# Patient Record
Sex: MALE | Race: WHITE | Employment: FULL TIME | URBAN - NONMETROPOLITAN AREA
[De-identification: names, ages, dates, MRNs, and addresses within clinical notes are randomized per-mention and may not be internally consistent; named-entity substitution may affect disease eponyms.]

---

## 2021-05-28 ENCOUNTER — HOSPITAL ENCOUNTER (OUTPATIENT)
Age: 36
Discharge: HOME | End: 2021-05-28
Payer: COMMERCIAL

## 2021-05-28 PROCEDURE — U0005 INFEC AGEN DETEC AMPLI PROBE: HCPCS

## 2021-05-28 PROCEDURE — U0003 INFECTIOUS AGENT DETECTION BY NUCLEIC ACID (DNA OR RNA); SEVERE ACUTE RESPIRATORY SYNDROME CORONAVIRUS 2 (SARS-COV-2) (CORONAVIRUS DISEASE [COVID-19]), AMPLIFIED PROBE TECHNIQUE, MAKING USE OF HIGH THROUGHPUT TECHNOLOGIES AS DESCRIBED BY CMS-2020-01-R: HCPCS

## 2021-05-29 LAB
SARS-COV-2: NOT DETECTED
SOURCE: NORMAL

## 2021-06-03 ENCOUNTER — ANESTHESIA EVENT (OUTPATIENT)
Dept: OPERATING ROOM | Age: 36
End: 2021-06-03
Payer: OTHER GOVERNMENT

## 2021-06-03 RX ORDER — IBUPROFEN 200 MG
200 TABLET ORAL EVERY 6 HOURS PRN
COMMUNITY

## 2021-06-03 RX ORDER — OXYCODONE HYDROCHLORIDE AND ACETAMINOPHEN 5; 325 MG/1; MG/1
1 TABLET ORAL EVERY 6 HOURS PRN
COMMUNITY

## 2021-06-03 NOTE — PROGRESS NOTES
Spoke with pt. Via telephone  Pt. will arrive at the ER entrance at 0600 on 6/4/21. Pt.informed that they may have one visitor come with them. The visitor must be free of covid symptoms. Both of them must wear a mask upon entering the hospital.  If they do not have a mask, one will be given to them at the . The masks must be worn the whole time they are in the building. The visitor must stay in the assigned room in [facility]. The visitor may not go to the vending machines, cafeteria, or walk around in the hospital. Pt. Will be NPO after MN tonight, including no gum, candy, or nicotine products. Pt. will take no medications the morning of the procedure. If the patient uses inhalers or cpap, they will bring them with them to the hospital.  Pt. will shower with soap and water and will not use any lotions, creams, ointments on their skin. Pt. will wear no jewelry or metal. Covid-19 test was completed on 5/28/21  and results are negative  Pt. Has been self-quarantining since their Covid-19 testing. Pt. Has had no cough, sore throat, fever, or any other unusual s/s that the physician should be made aware of before surgery. Pt. Had no further questions and/or concerns at this time. \[facility]\"" phone number was given should any further questions arise. [phone].

## 2021-06-03 NOTE — ANESTHESIA PRE PROCEDURE
Department of Anesthesiology  Preprocedure Note       Name:  Kelly    Age:  39 y.o.  :  1985                                          MRN:  [id number]         Date:  6/3/2021      Surgeon: Fili Blake):  William Akers DPM    Procedure: Procedure(s):  RIGHT SECONDARY ACHILLES TENDON LENGTHENING REPAIR, RIGHT RESECTION OF POSTERIOR SPUR  RIGHT PLANTAR FASCIA FASCIOTOMY ENDOSCOPIC    Medications prior to admission:   Prior to Admission medications    Not on File       Current medications:    No current facility-administered medications for this encounter. No current outpatient medications on file. Allergies:  Not on File    Problem List:  There is no problem list on file for this patient. Past Medical History:  No past medical history on file. Past Surgical History:  No past surgical history on file. Social History:    Social History     Tobacco Use    Smoking status: Not on file   Substance Use Topics    Alcohol use: Not on file                                Counseling given: Not Answered      Vital Signs (Current): There were no vitals filed for this visit. BP Readings from Last 3 Encounters:   No data found for BP       NPO Status:                                                                                 BMI:   Wt Readings from Last 3 Encounters:   No data found for Wt     There is no height or weight on file to calculate BMI.    CBC: No results found for: WBC, RBC, HGB, HCT, MCV, RDW, PLT    CMP: No results found for: NA, K, CL, CO2, BUN, CREATININE, GFRAA, AGRATIO, LABGLOM, GLUCOSE, PROT, CALCIUM, BILITOT, ALKPHOS, AST, ALT    POC Tests: No results for input(s): POCGLU, POCNA, POCK, POCCL, POCBUN, POCHEMO, POCHCT in the last 72 hours.     Coags: No results found for: PROTIME, INR, APTT    HCG (If Applicable): No results found for: PREGTESTUR, PREGSERUM, HCG, HCGQUANT     ABGs: No results found for: PHART, PO2ART, CDJ2BLY,

## 2021-06-04 ENCOUNTER — APPOINTMENT (OUTPATIENT)
Dept: GENERAL RADIOLOGY | Age: 36
End: 2021-06-04
Attending: PODIATRIST
Payer: OTHER GOVERNMENT

## 2021-06-04 ENCOUNTER — HOSPITAL ENCOUNTER (OUTPATIENT)
Age: 36
Setting detail: OUTPATIENT SURGERY
Discharge: HOME | End: 2021-06-04
Attending: PODIATRIST | Admitting: PODIATRIST
Payer: OTHER GOVERNMENT

## 2021-06-04 ENCOUNTER — ANESTHESIA (OUTPATIENT)
Dept: OPERATING ROOM | Age: 36
End: 2021-06-04
Payer: OTHER GOVERNMENT

## 2021-06-04 VITALS
RESPIRATION RATE: 2 BRPM | DIASTOLIC BLOOD PRESSURE: 72 MMHG | OXYGEN SATURATION: 90 % | SYSTOLIC BLOOD PRESSURE: 124 MMHG | TEMPERATURE: 98.6 F

## 2021-06-04 VITALS
TEMPERATURE: 98 F | WEIGHT: 265 LBS | SYSTOLIC BLOOD PRESSURE: 134 MMHG | BODY MASS INDEX: 35.89 KG/M2 | DIASTOLIC BLOOD PRESSURE: 91 MMHG | HEIGHT: 72 IN | RESPIRATION RATE: 16 BRPM | OXYGEN SATURATION: 94 % | HEART RATE: 79 BPM

## 2021-06-04 DIAGNOSIS — M66.361: Primary | ICD-10-CM

## 2021-06-04 DIAGNOSIS — R52 PAIN: ICD-10-CM

## 2021-06-04 PROCEDURE — 6360000002 HC RX W HCPCS: Performed by: NURSE ANESTHETIST, CERTIFIED REGISTERED

## 2021-06-04 PROCEDURE — 2580000003 HC RX 258: Performed by: PODIATRIST

## 2021-06-04 PROCEDURE — 3700000001 HC ADD 15 MINUTES (ANESTHESIA): Performed by: PODIATRIST

## 2021-06-04 PROCEDURE — 2709999900 HC NON-CHARGEABLE SUPPLY: Performed by: PODIATRIST

## 2021-06-04 PROCEDURE — 2500000003 HC RX 250 WO HCPCS: Performed by: PODIATRIST

## 2021-06-04 PROCEDURE — 7100000010 HC PHASE II RECOVERY - FIRST 15 MIN: Performed by: PODIATRIST

## 2021-06-04 PROCEDURE — 3600000003 HC SURGERY LEVEL 3 BASE: Performed by: PODIATRIST

## 2021-06-04 PROCEDURE — 2580000003 HC RX 258: Performed by: NURSE ANESTHETIST, CERTIFIED REGISTERED

## 2021-06-04 PROCEDURE — 3700000000 HC ANESTHESIA ATTENDED CARE: Performed by: PODIATRIST

## 2021-06-04 PROCEDURE — 2780000010 HC IMPLANT OTHER: Performed by: PODIATRIST

## 2021-06-04 PROCEDURE — 2500000003 HC RX 250 WO HCPCS: Performed by: NURSE ANESTHETIST, CERTIFIED REGISTERED

## 2021-06-04 PROCEDURE — 7100000000 HC PACU RECOVERY - FIRST 15 MIN: Performed by: PODIATRIST

## 2021-06-04 PROCEDURE — 2720000010 HC SURG SUPPLY STERILE: Performed by: PODIATRIST

## 2021-06-04 PROCEDURE — C1713 ANCHOR/SCREW BN/BN,TIS/BN: HCPCS | Performed by: PODIATRIST

## 2021-06-04 PROCEDURE — 6360000002 HC RX W HCPCS: Performed by: PODIATRIST

## 2021-06-04 PROCEDURE — 3600000013 HC SURGERY LEVEL 3 ADDTL 15MIN: Performed by: PODIATRIST

## 2021-06-04 PROCEDURE — 64445 NJX AA&/STRD SCIATIC NRV IMG: CPT | Performed by: NURSE ANESTHETIST, CERTIFIED REGISTERED

## 2021-06-04 PROCEDURE — 7100000001 HC PACU RECOVERY - ADDTL 15 MIN: Performed by: PODIATRIST

## 2021-06-04 PROCEDURE — 73650 X-RAY EXAM OF HEEL: CPT

## 2021-06-04 PROCEDURE — 7100000011 HC PHASE II RECOVERY - ADDTL 15 MIN: Performed by: PODIATRIST

## 2021-06-04 DEVICE — FOOTPRINT ULTRA PK SUTURE ANCHOR,                                    4.5 MM, SL
Type: IMPLANTABLE DEVICE | Site: FOOT | Status: FUNCTIONAL
Brand: FOOTPRINT

## 2021-06-04 RX ORDER — FENTANYL CITRATE 50 UG/ML
INJECTION, SOLUTION INTRAMUSCULAR; INTRAVENOUS PRN
Status: DISCONTINUED | OUTPATIENT
Start: 2021-06-04 | End: 2021-06-04 | Stop reason: SDUPTHER

## 2021-06-04 RX ORDER — ROPIVACAINE HYDROCHLORIDE 5 MG/ML
INJECTION, SOLUTION EPIDURAL; INFILTRATION; PERINEURAL PRN
Status: DISCONTINUED | OUTPATIENT
Start: 2021-06-04 | End: 2021-06-04 | Stop reason: SDUPTHER

## 2021-06-04 RX ORDER — ROPIVACAINE HYDROCHLORIDE 5 MG/ML
INJECTION, SOLUTION EPIDURAL; INFILTRATION; PERINEURAL PRN
Status: DISCONTINUED | OUTPATIENT
Start: 2021-06-04 | End: 2021-06-04

## 2021-06-04 RX ORDER — BUPIVACAINE HYDROCHLORIDE 5 MG/ML
INJECTION, SOLUTION EPIDURAL; INTRACAUDAL
Status: COMPLETED | OUTPATIENT
Start: 2021-06-04 | End: 2021-06-04

## 2021-06-04 RX ORDER — ROCURONIUM BROMIDE 10 MG/ML
INJECTION, SOLUTION INTRAVENOUS PRN
Status: DISCONTINUED | OUTPATIENT
Start: 2021-06-04 | End: 2021-06-04 | Stop reason: SDUPTHER

## 2021-06-04 RX ORDER — SODIUM CHLORIDE 0.9 % (FLUSH) 0.9 %
5-40 SYRINGE (ML) INJECTION ONCE
Status: COMPLETED | OUTPATIENT
Start: 2021-06-04 | End: 2021-06-04

## 2021-06-04 RX ORDER — HYDRALAZINE HYDROCHLORIDE 20 MG/ML
5 INJECTION INTRAMUSCULAR; INTRAVENOUS EVERY 10 MIN PRN
Status: DISCONTINUED | OUTPATIENT
Start: 2021-06-04 | End: 2021-06-04 | Stop reason: HOSPADM

## 2021-06-04 RX ORDER — FENTANYL CITRATE 50 UG/ML
25 INJECTION, SOLUTION INTRAMUSCULAR; INTRAVENOUS EVERY 5 MIN PRN
Status: DISCONTINUED | OUTPATIENT
Start: 2021-06-04 | End: 2021-06-04 | Stop reason: HOSPADM

## 2021-06-04 RX ORDER — SODIUM CHLORIDE 9 MG/ML
INJECTION INTRAVENOUS PRN
Status: DISCONTINUED | OUTPATIENT
Start: 2021-06-04 | End: 2021-06-04 | Stop reason: SDUPTHER

## 2021-06-04 RX ORDER — DIPHENHYDRAMINE HYDROCHLORIDE 50 MG/ML
12.5 INJECTION INTRAMUSCULAR; INTRAVENOUS
Status: DISCONTINUED | OUTPATIENT
Start: 2021-06-04 | End: 2021-06-04 | Stop reason: HOSPADM

## 2021-06-04 RX ORDER — MIDAZOLAM HYDROCHLORIDE 1 MG/ML
INJECTION INTRAMUSCULAR; INTRAVENOUS PRN
Status: DISCONTINUED | OUTPATIENT
Start: 2021-06-04 | End: 2021-06-04 | Stop reason: SDUPTHER

## 2021-06-04 RX ORDER — MEPERIDINE HYDROCHLORIDE 25 MG/ML
12.5 INJECTION INTRAMUSCULAR; INTRAVENOUS; SUBCUTANEOUS EVERY 5 MIN PRN
Status: DISCONTINUED | OUTPATIENT
Start: 2021-06-04 | End: 2021-06-04 | Stop reason: HOSPADM

## 2021-06-04 RX ORDER — SODIUM CHLORIDE, SODIUM LACTATE, POTASSIUM CHLORIDE, CALCIUM CHLORIDE 600; 310; 30; 20 MG/100ML; MG/100ML; MG/100ML; MG/100ML
INJECTION, SOLUTION INTRAVENOUS CONTINUOUS PRN
Status: DISCONTINUED | OUTPATIENT
Start: 2021-06-04 | End: 2021-06-04 | Stop reason: SDUPTHER

## 2021-06-04 RX ORDER — PROPOFOL 10 MG/ML
INJECTION, EMULSION INTRAVENOUS PRN
Status: DISCONTINUED | OUTPATIENT
Start: 2021-06-04 | End: 2021-06-04 | Stop reason: SDUPTHER

## 2021-06-04 RX ORDER — PROMETHAZINE HYDROCHLORIDE 25 MG/ML
6.25 INJECTION, SOLUTION INTRAMUSCULAR; INTRAVENOUS
Status: DISCONTINUED | OUTPATIENT
Start: 2021-06-04 | End: 2021-06-04 | Stop reason: HOSPADM

## 2021-06-04 RX ORDER — DEXAMETHASONE SODIUM PHOSPHATE 4 MG/ML
INJECTION, SOLUTION INTRA-ARTICULAR; INTRALESIONAL; INTRAMUSCULAR; INTRAVENOUS; SOFT TISSUE PRN
Status: DISCONTINUED | OUTPATIENT
Start: 2021-06-04 | End: 2021-06-04 | Stop reason: SDUPTHER

## 2021-06-04 RX ORDER — HYDROCODONE BITARTRATE AND ACETAMINOPHEN 5; 325 MG/1; MG/1
2 TABLET ORAL PRN
Status: DISCONTINUED | OUTPATIENT
Start: 2021-06-04 | End: 2021-06-04 | Stop reason: HOSPADM

## 2021-06-04 RX ORDER — KETOROLAC TROMETHAMINE 30 MG/ML
INJECTION, SOLUTION INTRAMUSCULAR; INTRAVENOUS PRN
Status: DISCONTINUED | OUTPATIENT
Start: 2021-06-04 | End: 2021-06-04 | Stop reason: SDUPTHER

## 2021-06-04 RX ORDER — PROCHLORPERAZINE EDISYLATE 5 MG/ML
5 INJECTION INTRAMUSCULAR; INTRAVENOUS
Status: DISCONTINUED | OUTPATIENT
Start: 2021-06-04 | End: 2021-06-04 | Stop reason: HOSPADM

## 2021-06-04 RX ORDER — SODIUM CHLORIDE, SODIUM LACTATE, POTASSIUM CHLORIDE, CALCIUM CHLORIDE 600; 310; 30; 20 MG/100ML; MG/100ML; MG/100ML; MG/100ML
INJECTION, SOLUTION INTRAVENOUS CONTINUOUS
Status: DISCONTINUED | OUTPATIENT
Start: 2021-06-04 | End: 2021-06-04 | Stop reason: HOSPADM

## 2021-06-04 RX ORDER — 0.9 % SODIUM CHLORIDE 0.9 %
500 INTRAVENOUS SOLUTION INTRAVENOUS
Status: DISCONTINUED | OUTPATIENT
Start: 2021-06-04 | End: 2021-06-04 | Stop reason: HOSPADM

## 2021-06-04 RX ORDER — HYDROCODONE BITARTRATE AND ACETAMINOPHEN 5; 325 MG/1; MG/1
1 TABLET ORAL PRN
Status: DISCONTINUED | OUTPATIENT
Start: 2021-06-04 | End: 2021-06-04 | Stop reason: HOSPADM

## 2021-06-04 RX ORDER — ONDANSETRON 2 MG/ML
INJECTION INTRAMUSCULAR; INTRAVENOUS PRN
Status: DISCONTINUED | OUTPATIENT
Start: 2021-06-04 | End: 2021-06-04 | Stop reason: SDUPTHER

## 2021-06-04 RX ORDER — ROPIVACAINE HYDROCHLORIDE 5 MG/ML
INJECTION, SOLUTION EPIDURAL; INFILTRATION; PERINEURAL
Status: COMPLETED | OUTPATIENT
Start: 2021-06-04 | End: 2021-06-04

## 2021-06-04 RX ADMIN — SODIUM CHLORIDE, POTASSIUM CHLORIDE, SODIUM LACTATE AND CALCIUM CHLORIDE: 600; 310; 30; 20 INJECTION, SOLUTION INTRAVENOUS at 06:39

## 2021-06-04 RX ADMIN — PROPOFOL 40 MG: 10 INJECTION, EMULSION INTRAVENOUS at 10:08

## 2021-06-04 RX ADMIN — FENTANYL CITRATE 50 MCG: 50 INJECTION, SOLUTION INTRAMUSCULAR; INTRAVENOUS at 08:08

## 2021-06-04 RX ADMIN — SODIUM CHLORIDE, POTASSIUM CHLORIDE, SODIUM LACTATE AND CALCIUM CHLORIDE: 600; 310; 30; 20 INJECTION, SOLUTION INTRAVENOUS at 10:35

## 2021-06-04 RX ADMIN — CEFAZOLIN 3000 MG: 1 INJECTION, POWDER, FOR SOLUTION INTRAMUSCULAR; INTRAVENOUS; PARENTERAL at 08:16

## 2021-06-04 RX ADMIN — SODIUM CHLORIDE 20 ML: 9 INJECTION, SOLUTION INTRAMUSCULAR; INTRAVENOUS; SUBCUTANEOUS at 09:02

## 2021-06-04 RX ADMIN — KETOROLAC TROMETHAMINE 15 MG: 30 INJECTION, SOLUTION INTRAMUSCULAR; INTRAVENOUS at 10:18

## 2021-06-04 RX ADMIN — SODIUM CHLORIDE, PRESERVATIVE FREE 10 ML: 5 INJECTION INTRAVENOUS at 06:39

## 2021-06-04 RX ADMIN — MIDAZOLAM 2 MG: 1 INJECTION INTRAMUSCULAR; INTRAVENOUS at 07:35

## 2021-06-04 RX ADMIN — ROCURONIUM BROMIDE 20 MG: 10 SOLUTION INTRAVENOUS at 09:49

## 2021-06-04 RX ADMIN — PROPOFOL 200 MG: 10 INJECTION, EMULSION INTRAVENOUS at 08:08

## 2021-06-04 RX ADMIN — ROPIVACAINE HYDROCHLORIDE 30 ML: 5 INJECTION, SOLUTION EPIDURAL; INFILTRATION; PERINEURAL at 07:47

## 2021-06-04 RX ADMIN — ROCURONIUM BROMIDE 50 MG: 10 SOLUTION INTRAVENOUS at 08:08

## 2021-06-04 RX ADMIN — SUGAMMADEX 300 MG: 100 INJECTION, SOLUTION INTRAVENOUS at 10:18

## 2021-06-04 RX ADMIN — FENTANYL CITRATE 50 MCG: 50 INJECTION, SOLUTION INTRAMUSCULAR; INTRAVENOUS at 07:35

## 2021-06-04 RX ADMIN — ONDANSETRON HYDROCHLORIDE 4 MG: 4 INJECTION, SOLUTION INTRAMUSCULAR; INTRAVENOUS at 08:08

## 2021-06-04 RX ADMIN — DEXAMETHASONE SODIUM PHOSPHATE 8 MG: 4 INJECTION, SOLUTION INTRAMUSCULAR; INTRAVENOUS at 08:08

## 2021-06-04 RX ADMIN — ROCURONIUM BROMIDE 30 MG: 10 SOLUTION INTRAVENOUS at 08:45

## 2021-06-04 RX ADMIN — ROPIVACAINE HYDROCHLORIDE 30 ML: 5 INJECTION, SOLUTION EPIDURAL; INFILTRATION; PERINEURAL at 07:51

## 2021-06-04 ASSESSMENT — PULMONARY FUNCTION TESTS
PIF_VALUE: 35
PIF_VALUE: 1
PIF_VALUE: 8
PIF_VALUE: 25
PIF_VALUE: 32
PIF_VALUE: 39
PIF_VALUE: 35
PIF_VALUE: 1
PIF_VALUE: 22
PIF_VALUE: 34
PIF_VALUE: 32
PIF_VALUE: 31
PIF_VALUE: 32
PIF_VALUE: 35
PIF_VALUE: 32
PIF_VALUE: 32
PIF_VALUE: 25
PIF_VALUE: 32
PIF_VALUE: 24
PIF_VALUE: 29
PIF_VALUE: 32
PIF_VALUE: 0
PIF_VALUE: 25
PIF_VALUE: 0
PIF_VALUE: 32
PIF_VALUE: 25
PIF_VALUE: 33
PIF_VALUE: 32
PIF_VALUE: 32
PIF_VALUE: 30
PIF_VALUE: 1
PIF_VALUE: 0
PIF_VALUE: 22
PIF_VALUE: 35
PIF_VALUE: 25
PIF_VALUE: 25
PIF_VALUE: 28
PIF_VALUE: 23
PIF_VALUE: 2
PIF_VALUE: 32
PIF_VALUE: 1
PIF_VALUE: 7
PIF_VALUE: 35
PIF_VALUE: 35
PIF_VALUE: 1
PIF_VALUE: 2
PIF_VALUE: 32
PIF_VALUE: 23
PIF_VALUE: 32
PIF_VALUE: 25
PIF_VALUE: 35
PIF_VALUE: 0
PIF_VALUE: 35
PIF_VALUE: 32
PIF_VALUE: 25
PIF_VALUE: 32
PIF_VALUE: 29
PIF_VALUE: 10
PIF_VALUE: 33
PIF_VALUE: 32
PIF_VALUE: 35
PIF_VALUE: 32
PIF_VALUE: 23
PIF_VALUE: 35
PIF_VALUE: 23
PIF_VALUE: 2
PIF_VALUE: 7
PIF_VALUE: 32
PIF_VALUE: 0
PIF_VALUE: 35
PIF_VALUE: 32
PIF_VALUE: 35
PIF_VALUE: 32
PIF_VALUE: 38
PIF_VALUE: 32
PIF_VALUE: 25
PIF_VALUE: 23
PIF_VALUE: 25
PIF_VALUE: 35
PIF_VALUE: 2
PIF_VALUE: 0
PIF_VALUE: 35
PIF_VALUE: 25
PIF_VALUE: 33
PIF_VALUE: 36
PIF_VALUE: 1
PIF_VALUE: 30
PIF_VALUE: 25
PIF_VALUE: 35
PIF_VALUE: 25
PIF_VALUE: 25
PIF_VALUE: 37
PIF_VALUE: 32
PIF_VALUE: 0
PIF_VALUE: 25
PIF_VALUE: 25
PIF_VALUE: 0
PIF_VALUE: 0
PIF_VALUE: 3
PIF_VALUE: 3
PIF_VALUE: 22
PIF_VALUE: 35
PIF_VALUE: 32
PIF_VALUE: 0
PIF_VALUE: 32
PIF_VALUE: 8
PIF_VALUE: 32
PIF_VALUE: 32
PIF_VALUE: 15
PIF_VALUE: 32
PIF_VALUE: 32
PIF_VALUE: 2
PIF_VALUE: 25
PIF_VALUE: 23
PIF_VALUE: 35
PIF_VALUE: 35
PIF_VALUE: 0
PIF_VALUE: 41
PIF_VALUE: 35
PIF_VALUE: 25
PIF_VALUE: 32
PIF_VALUE: 23
PIF_VALUE: 32
PIF_VALUE: 36
PIF_VALUE: 25
PIF_VALUE: 1
PIF_VALUE: 26
PIF_VALUE: 32
PIF_VALUE: 35
PIF_VALUE: 28
PIF_VALUE: 35
PIF_VALUE: 35
PIF_VALUE: 32
PIF_VALUE: 2
PIF_VALUE: 1
PIF_VALUE: 24
PIF_VALUE: 25
PIF_VALUE: 32
PIF_VALUE: 45
PIF_VALUE: 35
PIF_VALUE: 18
PIF_VALUE: 32
PIF_VALUE: 18
PIF_VALUE: 32
PIF_VALUE: 24
PIF_VALUE: 0
PIF_VALUE: 32

## 2021-06-04 ASSESSMENT — PAIN SCALES - GENERAL
PAINLEVEL_OUTOF10: 0

## 2021-06-04 ASSESSMENT — PAIN - FUNCTIONAL ASSESSMENT: PAIN_FUNCTIONAL_ASSESSMENT: 0-10

## 2021-06-04 NOTE — ANESTHESIA PRE PROCEDURE
Department of Anesthesiology  Preprocedure Note       Name:  Satish Lazar   Age:  39 y.o.  :  1985                                          MRN:  [id number]         Date:  2021      Surgeon: Hallie Murdock):  Bernie Ho DPM    Procedure: Procedure(s):  RIGHT SECONDARY ACHILLES TENDON LENGTHENING REPAIR, RIGHT RESECTION OF POSTERIOR SPUR  RIGHT PLANTAR FASCIA FASCIOTOMY ENDOSCOPIC    Medications prior to admission:   Prior to Admission medications    Medication Sig Start Date End Date Taking? Authorizing Provider   ibuprofen (ADVIL;MOTRIN) 200 MG tablet Take 200 mg by mouth every 6 hours as needed for Pain Pt states takes 3 every 6 hours. Yes Historical Provider, MD   oxyCODONE-acetaminophen (PERCOCET) 5-325 MG per tablet Take 1 tablet by mouth every 6 hours as needed for Pain. To start after surgery on 21   Yes Historical Provider, MD   enoxaparin (LOVENOX) 40 MG/0.4ML injection Inject into the skin daily 40mg sub cutaneous injection every day starting 24 hours post op   Yes Historical Provider, MD       Current medications:    Current Facility-Administered Medications   Medication Dose Route Frequency Provider Last Rate Last Admin    lactated ringers infusion   Intravenous Continuous Bernie Ho  mL/hr at 21 0639 New Bag at 21 0639    ceFAZolin (ANCEF) 3,000 mg in dextrose 5 % 50 mL IVPB  3,000 mg Intravenous Once Bernie Ho DPM           Allergies:  No Known Allergies    Problem List:  There is no problem list on file for this patient. Past Medical History:  History reviewed. No pertinent past medical history.     Past Surgical History:        Procedure Laterality Date    LASIK      pt states has had lasik surgery       Social History:    Social History     Tobacco Use    Smoking status: Former Smoker     Quit date:      Years since quittin.4    Smokeless tobacco: Never Used   Substance Use Topics    Alcohol use: Not Currently

## 2021-06-04 NOTE — ANESTHESIA POSTPROCEDURE EVALUATION
Department of Anesthesiology  Postprocedure Note    Patient: Eloisa Massey  MRN: [id number]  YOB: 1985  Date of evaluation: 6/4/2021  Time:  11:04 AM     Procedure Summary     Date: 06/04/21 Room / Location: [address] [facility] / [facility]    Anesthesia Start: [address] Anesthesia Stop: 1025    Procedures:       RIGHT SECONDARY ACHILLES TENDON LENGTHENING REPAIR, RIGHT RESECTION OF POSTERIOR SPUR (Right Foot)      RIGHT PLANTAR FASCIA FASCIOTOMY ENDOSCOPIC (Right Foot) Diagnosis:       Right Achilles tendinitis      Calcaneal spur of right foot      Plantar fascial fibromatosis      (Right Achilles tendinitis [M76.61], Calcaneal spur of right foot [M77.31], Plantar fascial fibromatosis [M72.2])    Surgeons: Melvin Ambriz DPM Responsible Provider: LYNDA Alonzo CRNA    Anesthesia Type: general, regional ASA Status: 2          Anesthesia Type: general, regional    Amilcar Phase I: Amilcar Score: 10    Amilcar Phase II:      Last vitals: Reviewed and per EMR flowsheets.        Anesthesia Post Evaluation    Patient location during evaluation: PACU  Patient participation: complete - patient participated  Level of consciousness: awake and alert  Pain score: 0  Airway patency: patent  Nausea & Vomiting: no nausea and no vomiting  Complications: no  Cardiovascular status: hemodynamically stable  Respiratory status: acceptable  Hydration status: euvolemic

## 2021-06-04 NOTE — ANESTHESIA PROCEDURE NOTES
Peripheral Block    Patient location during procedure: pre-op  Start time: 6/4/2021 7:28 AM  End time: 6/4/2021 7:52 AM  Staffing  Performed: resident/CRNA   Resident/CRNA: LYNDA Steiner CRNA  Preanesthetic Checklist  Completed: patient identified, IV checked, site marked, risks and benefits discussed, surgical consent, monitors and equipment checked, pre-op evaluation, timeout performed, anesthesia consent given, oxygen available and patient being monitored  Peripheral Block  Patient position: left lateral decubitus  Prep: ChloraPrep  Patient monitoring: cardiac monitor, continuous pulse ox, continuous capnometry, frequent blood pressure checks and IV access  Block type: Sciatic  Laterality: right  Injection technique: single-shot  Guidance: nerve stimulator and ultrasound guided  Popliteal  Provider prep: mask and sterile gloves  Needle  Needle type: combined needle/nerve stimulator   Needle gauge: 20 G  Needle length: 10 cm  Needle localization: nerve stimulator and ultrasound guidance  Assessment  Injection assessment: negative aspiration for heme, no paresthesia on injection and local visualized surrounding nerve on ultrasound  Paresthesia pain: none  Slow fractionated injection: yes  Hemodynamics: stable  Medications Administered  Ropivacaine (NAROPIN) injection 0.5%, 30 mL  Reason for block: post-op pain management and at surgeon's request

## 2021-06-04 NOTE — BRIEF OP NOTE
Brief Postoperative Note      Patient: Lorraine Morales  YOB: 1985  MRN: [id number]    Date of Procedure: 6/4/2021    Pre-Op Diagnosis: Right Achilles tendinitis [M76.61], Calcaneal spur of right foot [M77.31], Plantar fascial fibromatosis [M72.2]    Post-Op Diagnosis: Same       Procedure(s):  RIGHT SECONDARY ACHILLES TENDON LENGTHENING REPAIR, RIGHT RESECTION OF POSTERIOR SPUR  RIGHT PLANTAR FASCIA FASCIOTOMY ENDOSCOPIC    Surgeon(s):  ROB Toledo DPM    Assistant:  * No surgical staff found *    Anesthesia: General    Estimated Blood Loss (mL): Minimal    Complications: None    Specimens:   * No specimens in log *    Implants:  Implant Name Type Inv.  Item Serial No.  Lot No. LRB No. Used Action   Amnios RT Acellular Liquid  Amnion, 2.0mL    BONE BANK ALLOGRAFTS-PMM  Right 1 Implanted   Morphix XT Suture Romeo 3.5 mm 2 USP    [employer]-PMM [id number] Right 2 Implanted   ANCHOR SUT DIA4.5MM SL FOOTPRINT ULT PK  ANCHOR SUT DIA4.5MM SL FOOTPRINT ULT PK  [employer]WD [id number] Right 1 Implanted   ANCHOR SUT DIA4.5MM SL FOOTPRINT ULT PK  ANCHOR SUT DIA4.5MM SL FOOTPRINT ULT PK  [employer]WD [id number] Right 1 Implanted   SteriShield II Dual Layer Amnion Patch, 2x3 cm    BONE BANK [employer]-PMM  Right 1 Implanted         Drains: * No LDAs found *    Findings: as expected    Electronically signed by Aquiles Nieto DPM on 6/4/2021 at 10:10 AM

## 2021-06-04 NOTE — PROGRESS NOTES
Pre-procedure events explained to patient. He states he is nervous today bc he has never had a surgical procedure except for Lasik eye surgery. Patient states he woke up at 0100 and had a 20 oz Mountain Dew bottle in his hand. He states about half of it was gone.

## 2021-06-04 NOTE — H&P
..The patient was counseled at length about the risks of maria ines Covid-19 during their perioperative period and any recovery window from their procedure. The patient was made aware that maria ines Covid-19  may worsen their prognosis for recovering from their procedure  and lend to a higher morbidity and/or mortality risk. All material risks, benefits, and reasonable alternatives including postponing the procedure were discussed. The patient does wish to proceed with the procedure at this time.

## 2021-06-05 NOTE — OP NOTE
[facility]                  [address], [address]                                OPERATIVE REPORT    PATIENT NAME: Ilya Lantigua                    :        1985  MED REC NO:   [id number]                          ROOM:  ACCOUNT NO:   [de-identified]                           ADMIT DATE: 2021  PROVIDER:     Fariba Freeman DPM    DATE OF PROCEDURE:  2021    ATTENDING PROVIDER:  Fariba Freeman DPM    ASSISTANT:  Mark Prince DPM    PREOPERATIVE DIAGNOSES:  1. Right secondary tear of Achilles tendon at the insertion. 2.  Posterior calcaneal bone spur on the right. 3.  Plantar fasciitis on the right. POSTOPERATIVE DIAGNOSES:  1. Right secondary tear of Achilles tendon at the insertion. 2.  Posterior calcaneal bone spur on the right. 3.  Plantar fasciitis on the right. PROCEDURES:  1. Repair of secondary tear of Achilles tendon on the right/rupture. 2.  Resection of posterior calcaneal bone spur on the right. 3.  Endoscopic plantar fascial release on the right. 4.  Application of posterior splint on the right. 5.  Fluoroscopic evaluation on the right. ANESTHESIA:  General.    HEMOSTASIS:  Pneumatic thigh tourniquet set at 350 mmHg, initially 23  minutes and then released and then another roughly 60 minutes. INJECTABLES:  The patient did have amniotic bone bank injectable 2 mm  for the endoscopic plantar fascial release. The patient also had 5 mL  of Marcaine for the heel. The patient had 2 mL of Marcaine for the  plantar heel and also, the patient had a popliteal block prior to the  procedure. COMPLICATIONS:  None. CONDITION:  Stable. INDICATIONS FOR THE PROCEDURE:  The patient is a very pleasant  49-year-old gentleman who has been seen in the office for quite some  time with continued pain associated to the right Achilles insertion  point and also the plantar heel.   The patient had tried and failed technique prior to the procedure. We then made  arrangements to convert him to prone. We then converted him to prone. We then redid the prep, scrub, and drape in normal aseptic technique. Attention was then directed to the posterior aspect of the right  Achilles where an incision was made, central midline incision, down  through the skin and subcutaneous layer with care to cauterize all  bleeders. We went down further to the level of the paratenon. We  incised the paratenon, reflected it, and tagged it. Next, we went to  central midline incision down through the Achilles tendon. We then  resected and did our T-incision and left the most medial and lateral  distal regions intact. We then reflected the tendon off of the bone in  its medial and central attachment. At this time, we did notice that  there were significant hypertrophic changes to the tendon. There was  some mucinous degeneration to the tendon, but after debridement of the  large fragments and debridement, the region looked good and healthy and  had good strength to it. The patient then had the posterior calcaneal  bone spur resected. This was resected utilizing a combination of  osteotome and mallet to start and then power reciprocating rasp. The  area was irrigated with copious amounts of normal sterile saline. We  took several fluoroscopic pictures to make sure that we had not left any  fragments behind, and the posterior fragment was resected and nicely  contoured. We irrigated the region. We then drilled for our two  proximal Morphix anchors and then for our distal footprint anchors. At  this time, we utilized the 3.5 Morphix anchor proximally, _____ sutures,  and then utilized the footprint anchors on both sides to hold that in  place and really reattach it to the bone. We did this with both medial  and lateral.  We got an excellent reattachment until the bone and the  strength was really good.   I put him through range of motion and there  was no movement. Next, we did 6-0 Prolene to close the central incision of the Achilles  burying the knot. We then did the bone bank SteriShield II amniotic  patch and then closed the paratenon over that with a 5-0 Vicryl. We  then utilized 4-0 Vicryl to close the subcutaneous and subcuticular  regions and then closed the posterior Achilles with ZipLine and  Steri-Strips. The patient then had the above-listed injectable  infiltrated in the region. The patient had some Betadine, 4x4's, ABD,  two Kerlix, three Webril, and a posterior splint at 90 degrees. The  patient tolerated the procedure and anesthesia well and left the OR with  vital signs stable and vascular status intact to the right foot. The  patient will be seen in our office next week for a followup appointment. I will give him a courtesy call on Sunday to check on him and see how he  is doing and we shall go from there.         Shyam Forde DPM    D: 06/04/2021 10:38:49       T: 06/04/2021 12:40:45     RACHEL_BONNIE  Job#: [id number]     Doc#: [id number]    CC:

## 2021-08-13 ENCOUNTER — HOSPITAL ENCOUNTER (OUTPATIENT)
Dept: PHYSICAL THERAPY | Age: 36
Setting detail: THERAPIES SERIES
Discharge: HOME | End: 2021-08-13
Payer: OTHER GOVERNMENT

## 2021-08-13 PROCEDURE — 97161 PT EVAL LOW COMPLEX 20 MIN: CPT

## 2021-08-13 PROCEDURE — 97110 THERAPEUTIC EXERCISES: CPT

## 2021-08-13 ASSESSMENT — PAIN DESCRIPTION - PROGRESSION: CLINICAL_PROGRESSION: GRADUALLY IMPROVING

## 2021-08-13 ASSESSMENT — PAIN DESCRIPTION - FREQUENCY: FREQUENCY: INTERMITTENT

## 2021-08-13 ASSESSMENT — PAIN DESCRIPTION - ORIENTATION: ORIENTATION: RIGHT;POSTERIOR

## 2021-08-13 ASSESSMENT — PAIN DESCRIPTION - DESCRIPTORS: DESCRIPTORS: SHARP;SHOOTING

## 2021-08-13 ASSESSMENT — PAIN DESCRIPTION - ONSET: ONSET: GRADUAL

## 2021-08-13 ASSESSMENT — PAIN SCALES - GENERAL: PAINLEVEL_OUTOF10: 0

## 2021-08-13 ASSESSMENT — PAIN - FUNCTIONAL ASSESSMENT: PAIN_FUNCTIONAL_ASSESSMENT: PREVENTS OR INTERFERES SOME ACTIVE ACTIVITIES AND ADLS

## 2021-08-13 ASSESSMENT — PAIN DESCRIPTION - PAIN TYPE: TYPE: CHRONIC PAIN

## 2021-08-13 NOTE — PLAN OF CARE
Outpatient Physical Therapy           [place]           [] Phone: [phone]   Fax: [phone]  [place]           [] Phone: [phone]   Fax: [phone]     To: Referring Practitioner: Dr. Makeda Copeland    From: Emerita Norman, PT, DPT, OCS     Patient: Olga Ordonez        : 1985  Diagnosis: Diagnosis: R ankle 21   Treatment Diagnosis: Treatment Diagnosis: R ankle pain, weakness, stiffnes   Date: 2021    Physical Therapy Certification/Re-Certification Form  Dear Dr. Makeda Copeland   The following patient has been evaluated for physical therapy services and for therapy to continue, insurance requires physician review of the treatment plan initially and every 90 days. Please review the attached evaluation and/or summary of the patient's plan of care, and verify that you agree therapy should continue by signing the attached document and sending it back to our office. Assessment:      Pt is 39year old male s/p R achilles lengthening, bone spur removal and plantar fasciotomy 21. Pt now has difficulties completing prolonged ambulation with tennis she and unable to return to work duties. Pt demo deficits this date that include R gastroc weakness, flexibility restrictions, balance stability and min/mod pain. Overall, pt progressing well with min deficits with high step count required for work duties with walking nearly continuously for hours. Pt will benefit with PT services with progression of strength/ROM, manual and modalties to return to PLOF. Pt prior to onset of current condition had min/no pain with able to complete full ADLs and work activities. Patient received education on their current pathology and how their condition effects them with their functional activities. Patient understood discussion and questions were answered. Patient understands their activity limitations and understands rational for treatment progression.     Plan of Care/Treatment to date:  [x] Therapeutic Exercise  [x] Modalities:  [x] Therapeutic Activity     [] Ultrasound  [x] Electrical Stimulation  [x] Gait Training      [] Cervical Traction [] Lumbar Traction  [x] Neuromuscular Re-education    [x] Cold/hotpack [] Iontophoresis   [x] Instruction in HEP      [x] Vasopneumatic    [] Dry Needling  [x] Manual Therapy               [] Aquatic Therapy       Other:          Frequency/Duration:  # Days per week: [] 1 day # Weeks: [] 1 week [] 5 weeks     [x] 2 days   [] 2 weeks [] 6 weeks     [] 3 days   [] 3 weeks [] 7 weeks     [] 4 days   [x] 4 weeks [] 8 weeks         [] 9 weeks [] 10 weeks         [] 11 weeks [] 12 weeks    Rehab Potential/Progress: [] Excellent [x] Good [] Fair  [] Poor     Goals:    Patient goals : return to full mobility and no pain. Short term goals  Time Frame for Short term goals: Defer to [address] term goals  Time Frame for Long term goals : 4 weeks 9/13/21  Long term goal 1: Pt will demo I with HEP/symptom management. Long term goal 2: Pt will demo >20 sec R SLS with min/no pain to demo improved balance. Long term goal 3: Pt will report >60/80 per LEFS to demo improved function. Long term goal 4: Pt will demo R SL heelrasies >10 reps with min/no pain to demo improved strength/tolerance  Long term goal 5: Pt will demo >14 deg DF to assist with normal gait mechanics. Electronically signed by:  Navya Ocasio PT, DPT, OCS  8/13/2021, 9:19 AM    8/13/2021 9:19 AM         If you have any questions or concerns, please don't hesitate to call.   Thank you for your referral.      Physician Signature:________________________________Date:_________ TIME: _____  By signing above, therapists plan is approved by physician

## 2021-08-13 NOTE — FLOWSHEET NOTE
Outpatient Physical Therapy  [place]           [x] Phone: [phone]   Fax: [phone]  [place]           [] Phone: [phone]   Fax: [phone]        Physical Therapy Daily Treatment Note  Date:  2021    Patient Name:  Connie Murrell    :  1985  MRN: [id number]  Restrictions/Precautions:    Diagnosis:   Diagnosis: R ankle achillles lengthening, one spur removal, plantar fasciotomy   Date of Injury/Surgery: 21  Treatment Diagnosis: Treatment Diagnosis: R ankle pain, weakness, stiffnes    Insurance/Certification information:    [facility]  Referring Physician:  Referring Practitioner: Dr. Ashton Hidden  Next Doctor Visit:    Plan of care signed (Y/N):  N, sent 21   Outcome Measure: LEFS: 42/80   Visit# / total visits:   per POC  Pain level: 1/10   Goals:     Patient goals : return to full mobility and no pain. Short term goals  Time Frame for Short term goals: Defer to [address] [facility] term goals  Time Frame for Long term goals : 4 weeks 21  Long term goal 1: Pt will demo I with HEP/symptom management. Long term goal 2: Pt will demo >20 sec R SLS with min/no pain to demo improved balance. Long term goal 3: Pt will report >60/80 per LEFS to demo improved function. Long term goal 4: Pt will demo R SL heelrasies >10 reps with min/no pain to demo improved strength/tolerance  Long term goal 5: Pt will demo >14 deg DF to assist with normal gait mechanics. Summary of Evaluation:   Pt is 39year old male s/p R achilles lengthening, bone spur removal and plantar fasciotomy 21. Pt now has difficulties completing prolonged ambulation with tennis she and unable to return to work duties. Pt demo deficits this date that include R gastroc weakness, flexibility restrictions, balance stability and min/mod pain. Overall, pt progressing well with min deficits with high step count required for work duties with walking nearly continuously for hours.  Pt will benefit with PT services with progression of strength/ROM, manual and modalties to return to PLOF. Pt prior to onset of current condition had min/no pain with able to complete full ADLs and work activities. Patient received education on their current pathology and how their condition effects them with their functional activities. Patient understood discussion and questions were answered. Patient understands their activity limitations and understands rational for treatment progression. Subjective:  See claraal         Any changes in Ambulatory Summary Sheet? None        Objective:  See eval   COVID screening questions were asked and patient attested that there had been no contact or symptoms        Exercises: (No more than 4 columns)   Exercise/Equipment 8/13/21  #1 Date Date           WARM UP         Nu step             TABLE      *Towel gastroc stretch  15\"x4                                STANDING      *R lean DL heelraises  10x2     wobbleboard AP taps with R LE      FW Bwd stepping       R SL heelraises on shuttle                              PROPRIOCEPTION      *SLS 20\"x4     BOSU marches       airex step taps                   MODALITIES                      Other Therapeutic Activities/Education:  Patient received education on their current pathology and how their condition effects them with their functional activities. Patient understood discussion and questions were answered. Patient understands their activity limitations and understands rational for treatment progression. Educated on benefits to initiating walking program with monitoring of pain to tolerance and weaning daily with tennis shoe to assist return to previous activity. Pt reported understanding. Home Exercise Program:  HO issued, reviewed and discussed with patient. Pt agreed to comply.         Manual Treatments:  --      Modalities:  --      Communication with other providers:  POC sent 8/13/21      Assessment:  (Response towards treatment session) (Pain Rating)      Pt is 39year old male s/p R achilles lengthening, bone spur removal and plantar fasciotomy 6/4/21. Pt now has difficulties completing prolonged ambulation with tennis she and unable to return to work duties. Pt demo deficits this date that include R gastroc weakness, flexibility restrictions, balance stability and min/mod pain. Overall, pt progressing well with min deficits with high step count required for work duties with walking nearly continuously for hours. Pt will benefit with PT services with progression of strength/ROM, manual and modalties to return to OF. Pt prior to onset of current condition had min/no pain with able to complete full ADLs and work activities. Patient received education on their current pathology and how their condition effects them with their functional activities. Patient understood discussion and questions were answered. Patient understands their activity limitations and understands rational for treatment progression. Plan for Next Session: Specific instructions for Next Treatment: review HEP, manual gastroc stretching, scar mobility, gastroc stretching, gastroc strengthening as able, uneven surfaces, gameready if needed.       Time In / Time Out:    0815/0910       If [facility] Please Indicate Time In/Out/Total Time  CPT Code Time in Time out Total Time                                                            Total for session             Timed Code/Total Treatment Minutes:  25/55'     25' TE including education, 1 PT eval       Next Progress Note due:  Lg 8/13/21   Visit 10       Plan of Care Interventions:  [x] Therapeutic Exercise  [x] Modalities:  [x] Therapeutic Activity     [] Ultrasound  [] Estim  [x] Gait Training      [] Cervical Traction [] Lumbar Traction  [x] Neuromuscular Re-education    [x] Cold/hotpack [] Iontophoresis   [x] Instruction in HEP      [x] Vasopneumatic   [] Dry Needling    [x] Manual Therapy               [] Aquatic

## 2021-08-13 NOTE — PROGRESS NOTES
Physical Therapy  Initial Assessment  Date: 2021  Patient Name: Chitra Farrell  MRN: [id number]  : 1985     Treatment Diagnosis: R ankle pain, weakness, stiffnes    Restrictions       Subjective   General  Chart Reviewed: Yes  Patient assessed for rehabilitation services?: Yes  Referring Practitioner: Dr. Miguel Garsia  Diagnosis: R ankle 21  Subjective  Subjective: s/p 21 R achilles lengthening, bone spur removal, plantar fasciotomy. Hard cast for one week then issued boot since. Return 2 weeks with transition to tennis shoe for short periods and went to the [facility] and worn shoe recently at the [facility]. Did have increase in pain while at the fair. Min N/T with donning of shes with throbbing pain. Very min N/T with walking boot. No exercsies. Completing ice and elevation. Not taking any meds at this time. Return Aug 23 for follow up with Dr. Miguel Garsia. Pain Screening  Patient Currently in Pain: Yes  Pain Assessment  Pain Assessment: 0-10  Pain Level: 0 (Worst: 7-8/10)  Patient's Stated Pain Goal: No pain  Pain Type: Chronic pain  Pain Location:  (heel)  Pain Orientation: Right;Posterior  Pain Descriptors: Olam Arnold; Shooting  Pain Frequency: Intermittent  Pain Onset: Gradual  Clinical Progression: Gradually improving  Functional Pain Assessment: Prevents or interferes some active activities and ADLs  Vital Signs  Patient Currently in Pain: Yes    Vision/Hearing  Vision  Vision: Within Functional Limits  Hearing  Hearing: Within functional limits    Orientation  Orientation  Overall Orientation Status: Within Normal Limits    Social/Functional History  Social/Functional History  Type of Home: House  Home Layout: Two level; Work area in [place] Access: Stairs to enter without rails  Entrance Stairs - Number of Steps: 2  Bathroom Shower/Tub: Tub/Shower unit  ADL Assistance: Independent  Homemaking Assistance: Independent  Ambulation Assistance: Independent  Transfer Assistance: Independent  Active : required for work duties with walking nearly continuously for hours. Pt will benefit with PT services with progression of strength/ROM, manual and modalties to return to PLOF. Pt prior to onset of current condition had min/no pain with able to complete full ADLs and work activities. Patient received education on their current pathology and how their condition effects them with their functional activities. Patient understood discussion and questions were answered. Patient understands their activity limitations and understands rational for treatment progression. Treatment Diagnosis: R ankle pain, weakness, stiffnes  Prognosis: Good  Decision Making: Low Complexity  Barriers to Learning: None  REQUIRES PT FOLLOW UP: Yes  Activity Tolerance  Activity Tolerance: Patient Tolerated treatment well         Plan   Plan  Times per week: 2  Plan weeks: 4  Specific instructions for Next Treatment: review HEP, manual gastroc stretching, scar mobility, gastroc stretching, gastroc strengthening as able, uneven surfaces, gameready if needed. Current Treatment Recommendations: Strengthening, ROM, Neuromuscular Re-education, Home Exercise Program, Manual Therapy - Soft Tissue Mobilization, Modalities, Gait Training, Balance Training       Goals  Short term goals  Time Frame for Short term goals: Defer to [address] term goals  Time Frame for Long term goals : 4 weeks 9/13/21  Long term goal 1: Pt will demo I with HEP/symptom management. Long term goal 2: Pt will demo >20 sec R SLS with min/no pain to demo improved balance. Long term goal 3: Pt will report >60/80 per LEFS to demo improved function. Long term goal 4: Pt will demo R SL heelrasies >10 reps with min/no pain to demo improved strength/tolerance  Long term goal 5: Pt will demo >14 deg DF to assist with normal gait mechanics. Patient Goals   Patient goals : return to full mobility and no pain.        Marva Aleman, PT, DPT, OCS    8/13/2021 9:16 AM

## 2021-08-16 NOTE — FLOWSHEET NOTE
Patients Plan of Care was received and signed. Signed POC was scanned and placed in the patients chart.     Shereen Campbell

## 2021-08-17 ENCOUNTER — HOSPITAL ENCOUNTER (OUTPATIENT)
Dept: PHYSICAL THERAPY | Age: 36
Setting detail: THERAPIES SERIES
Discharge: HOME | End: 2021-08-17
Payer: OTHER GOVERNMENT

## 2021-08-17 PROCEDURE — 97110 THERAPEUTIC EXERCISES: CPT

## 2021-08-17 PROCEDURE — 97112 NEUROMUSCULAR REEDUCATION: CPT

## 2021-08-17 NOTE — FLOWSHEET NOTE
Outpatient Physical Therapy  [place]           [x] Phone: [phone]   Fax: [phone]  [facility]           [] Phone: [phone]   Fax: [phone]        Physical Therapy Daily Treatment Note  Date:  2021    Patient Name:  Moo Lovelace    :  1985  MRN: [id number]  Restrictions/Precautions:    Diagnosis:   Diagnosis: R ankle achillles lengthening, one spur removal, plantar fasciotomy   Date of Injury/Surgery: 21  Treatment Diagnosis: Treatment Diagnosis: R ankle pain, weakness, stiffness    Insurance/Certification information:    [facility]  Referring Physician:  Referring Practitioner: Dr. Abilio Chavarria  Next Doctor Visit:  21  Plan of care signed (Y/N):   Yes  Outcome Measure: LEFS: 42/80   Visit# / total visits: 2 / 8 per POC  Pain level: 0 /10   Goals:     Patient goals : return to full mobility and no pain. Short term goals  Time Frame for Short term goals: Defer to [address] [facility] term goals  Time Frame for Long term goals : 4 weeks 21  Long term goal 1: Pt will demo I with HEP/symptom management. Long term goal 2: Pt will demo >20 sec R SLS with min/no pain to demo improved balance. Long term goal 3: Pt will report >60/80 per LEFS to demo improved function. Long term goal 4: Pt will demo R SL heelrasies >10 reps with min/no pain to demo improved strength/tolerance  Long term goal 5: Pt will demo >14 deg DF to assist with normal gait mechanics. Summary of Evaluation:   Pt is 39year old male s/p R achilles lengthening, bone spur removal and plantar fasciotomy 21. Pt now has difficulties completing prolonged ambulation with tennis she and unable to return to work duties. Pt demo deficits this date that include R gastroc weakness, flexibility restrictions, balance stability and min/mod pain. Overall, pt progressing well with min deficits with high step count required for work duties with walking nearly continuously for hours.  Pt will benefit with PT services stretching, gastroc strengthening as able, uneven surfaces, gameready if needed.       Time In / Time Out:    7186-3262       If [facility] Please Indicate Time In/Out/Total Time  CPT Code Time in Time out Total Time                                                            Total for session             Timed Code/Total Treatment Minutes:  42/42'      20' TE   22' Neuro       Next Progress Note due:  Lg 8/13/21   Visit 10       Plan of Care Interventions:  [x] Therapeutic Exercise  [x] Modalities:  [x] Therapeutic Activity     [] Ultrasound  [] Estim  [x] Gait Training      [] Cervical Traction [] Lumbar Traction  [x] Neuromuscular Re-education    [x] Cold/hotpack [] Iontophoresis   [x] Instruction in HEP      [x] Vasopneumatic   [] Dry Needling    [x] Manual Therapy               [] Aquatic Therapy              Electronically signed by:  Adalberto Edwards, PT, DPT, OCS  8/17/2021, 7:26 AM    8/17/2021 7:26 AM

## 2021-08-23 ENCOUNTER — HOSPITAL ENCOUNTER (OUTPATIENT)
Dept: PHYSICAL THERAPY | Age: 36
Setting detail: THERAPIES SERIES
Discharge: HOME | End: 2021-08-23
Payer: OTHER GOVERNMENT

## 2021-08-23 PROCEDURE — 97530 THERAPEUTIC ACTIVITIES: CPT

## 2021-08-23 PROCEDURE — 97112 NEUROMUSCULAR REEDUCATION: CPT

## 2021-08-23 NOTE — FLOWSHEET NOTE
Outpatient Physical Therapy  [place]           [x] Phone: [phone]   Fax: [phone]  [facility]           [] Phone: [phone]   Fax: [phone]        Physical Therapy Daily Treatment Note  Date:  2021    Patient Name:  Frantz Gongora    :  1985  MRN: [id number]  Restrictions/Precautions:    Diagnosis:   Diagnosis: R ankle achillles lengthening, one spur removal, plantar fasciotomy   Date of Injury/Surgery: 21  Treatment Diagnosis: Treatment Diagnosis: R ankle pain, weakness, stiffness    Insurance/Certification information:    [facility]  Referring Physician:  Referring Practitioner: Dr. Geoff Shelton  Next Doctor Visit:  Angie Patel of care signed (Y/N):   Yes  Outcome Measure: LEFS: 42/80   Visit# / total visits: 3 / 8 per POC  Pain level: 3-4 /10   Goals:     Patient goals : return to full mobility and no pain. Short term goals  Time Frame for Short term goals: Defer to 1200 [facility] term goals  Time Frame for Long term goals : 4 weeks 21  Long term goal 1: Pt will demo I with HEP/symptom management. Long term goal 2: Pt will demo >20 sec R SLS with min/no pain to demo improved balance. Long term goal 3: Pt will report >60/80 per LEFS to demo improved function. Long term goal 4: Pt will demo R SL heelrasies >10 reps with min/no pain to demo improved strength/tolerance  Long term goal 5: Pt will demo >14 deg DF to assist with normal gait mechanics. Summary of Evaluation:   Pt is 39year old male s/p R achilles lengthening, bone spur removal and plantar fasciotomy 21. Pt now has difficulties completing prolonged ambulation with tennis she and unable to return to work duties. Pt demo deficits this date that include R gastroc weakness, flexibility restrictions, balance stability and min/mod pain. Overall, pt progressing well with min deficits with high step count required for work duties with walking nearly continuously for hours.  Pt will benefit with PT services with progression of strength/ROM, manual and modalties to return to PLOF. Pt prior to onset of current condition had min/no pain with able to complete full ADLs and work activities. Patient received education on their current pathology and how their condition effects them with their functional activities. Patient understood discussion and questions were answered. Patient understands their activity limitations and understands rational for treatment progression. Subjective:   Pt stated that his pain was 3-4/10 today. Pt stated that his [employer] [address] [employer] stepped on his tendon. Pt stated that he had an x-ray and everything is ok. He is supposed to RTW on Sept 7th, but may go back next weekend         Any changes in Ambulatory Summary Sheet?   None        Objective:     COVID screening questions were asked and patient attested that there had been no contact or symptoms    Hypersensitivity along incision inner heel      Exercises: (No more than 4 columns)   Exercise/Equipment 8/13/21  #1 8/17/21 #2 8/23/2021 #3           WARM UP         Nu step       R bike   3'  5'    TABLE      *Towel gastroc stretch  15\"x4 15\"x4 --                              STANDING      *R lean DL heelraises  10x2     wobbleboard AP taps with R LE  10x2 10x2   FW Bwd stepping       R SL heelraises on shuttle   2c 15x   1c 15x 1C 20x2   Anterior eccentric step down   6in 10x2 6\" 102   R SLS BOSU circles   10x2 each dir  10x2 ea dir    FR calf stretch        FR 30\"x2                     PROPRIOCEPTION      *SLS 20\"x4 BOSU 20\"x3 BOSU 30\"x2   BOSU marches   30x2 20x2   airex step taps   12in 30x2 12\" cone    wobbleboard  30\"x2 both dir  30\"x2 both dir    Lateral step over BOSU  10x2 10x2   DLS on BOSU with mod perturbations and EC  20\"x2 each  20\" x2 DL  20\" x1 SL                     MODALITIES                      Other Therapeutic Activities/Education:  --    Educated on benefits to initiating walking program with monitoring of pain to tolerance and weaning daily with tennis shoe to assist return to previous activity. Pt reported understanding. Home Exercise Program:  HO issued, reviewed and discussed with patient. Pt agreed to comply. Manual Treatments:  --      Modalities:  --      Communication with other providers:  POC sent 8/13/21      Assessment:  (Response towards treatment session) (Pain Rating) Pt tolerated treatment fairly well. Pt was able to advance activity. Pt rated pain unchanged at 3-4/10 . Pt will continue to benefit from more therapy to increase strength, ROM, and balance. Pt is 39year old male s/p R achilles lengthening, bone spur removal and plantar fasciotomy 6/4/21. Pt now has difficulties completing prolonged ambulation with tennis she and unable to return to work duties. Pt demo deficits this date that include R gastroc weakness, flexibility restrictions, balance stability and min/mod pain. Overall, pt progressing well with min deficits with high step count required for work duties with walking nearly continuously for hours. Pt will benefit with PT services with progression of strength/ROM, manual and modalties to return to PLOF. Pt prior to onset of current condition had min/no pain with able to complete full ADLs and work activities. Patient received education on their current pathology and how their condition effects them with their functional activities. Patient understood discussion and questions were answered. Patient understands their activity limitations and understands rational for treatment progression. Plan for Next Session: Specific instructions for Next Treatment: review HEP, manual gastroc stretching, scar mobility, gastroc stretching, gastroc strengthening as able, uneven surfaces, gameready if needed.       Time In / Time Out:    0830/0908       If [facility] Please Indicate Time In/Out/Total Time  CPT Code Time in Time out Total Time Total for session             Timed Code/Total Treatment Minutes:  38'/38' 1 neuro (15') 2 TA ( 23')     Next Progress Note due:  Eval 8/13/21   Visit 10       Plan of Care Interventions:  [x] Therapeutic Exercise  [x] Modalities:  [x] Therapeutic Activity     [] Ultrasound  [] Estim  [x] Gait Training      [] Cervical Traction [] Lumbar Traction  [x] Neuromuscular Re-education    [x] Cold/hotpack [] Iontophoresis   [x] Instruction in HEP      [x] Vasopneumatic   [] Dry Needling    [x] Manual Therapy               [] Aquatic Therapy              Electronically signed by:  Breann Fox  8/23/2021, 8:30 AM     8/23/2021,10:12 AM

## 2021-08-27 ENCOUNTER — HOSPITAL ENCOUNTER (OUTPATIENT)
Dept: PHYSICAL THERAPY | Age: 36
Setting detail: THERAPIES SERIES
Discharge: HOME | End: 2021-08-27
Payer: OTHER GOVERNMENT

## 2021-08-27 PROCEDURE — 97110 THERAPEUTIC EXERCISES: CPT

## 2021-08-27 PROCEDURE — 97112 NEUROMUSCULAR REEDUCATION: CPT

## 2021-08-27 PROCEDURE — 97530 THERAPEUTIC ACTIVITIES: CPT

## 2021-08-27 NOTE — FLOWSHEET NOTE
Outpatient Physical Therapy  [facility]           [x] Phone: [phone]   Fax: [phone]  Teresa christianson           [] Phone: [phone]   Fax: [phone]        Physical Therapy Daily Treatment Note  Date:  2021    Patient Name:  Sandra Wilson    :  1985  MRN: [id number]  Restrictions/Precautions:    Diagnosis:   Diagnosis: R ankle achillles lengthening, one spur removal, plantar fasciotomy   Date of Injury/Surgery: 21  Treatment Diagnosis: Treatment Diagnosis: R ankle pain, weakness, stiffness    Insurance/Certification information:    [facility]  Referring Physician:  Referring Practitioner: Dr. Mirian Evans  Next Doctor Visit:  21  Plan of care signed (Y/N):   Yes  Outcome Measure: LEFS: 42/80   Visit# / total visits: 4 / 8 per POC  Pain level:  0 /10   Goals:     Patient goals : return to full mobility and no pain. Short term goals  Time Frame for Short term goals: Defer to 1200 [facility] term goals  Time Frame for Long term goals : 4 weeks 21  Long term goal 1: Pt will demo I with HEP/symptom management. Long term goal 2: Pt will demo >20 sec R SLS with min/no pain to demo improved balance. MET  Long term goal 3: Pt will report >60/80 per LEFS to demo improved function. Long term goal 4: Pt will demo R SL heelrasies >10 reps with min/no pain to demo improved strength/tolerance Progressing   Long term goal 5: Pt will demo >14 deg DF to assist with normal gait mechanics. Summary of Evaluation:   Pt is 39year old male s/p R achilles lengthening, bone spur removal and plantar fasciotomy 21. Pt now has difficulties completing prolonged ambulation with tennis she and unable to return to work duties. Pt demo deficits this date that include R gastroc weakness, flexibility restrictions, balance stability and min/mod pain. Overall, pt progressing well with min deficits with high step count required for work duties with walking nearly continuously for hours.  Pt will benefit with PT services with progression of strength/ROM, manual and modalties to return to PLOF. Pt prior to onset of current condition had min/no pain with able to complete full ADLs and work activities. Patient received education on their current pathology and how their condition effects them with their functional activities. Patient understood discussion and questions were answered. Patient understands their activity limitations and understands rational for treatment progression. Subjective:   Pt stated that his pain was 0/10 today. He is supposed to RTW on Sept 7th, but return early for possible light duty. States been doing community mobility without issue. Any changes in Ambulatory Summary Sheet? None        Objective:     COVID screening questions were asked and patient attested that there had been no contact or symptoms    Normal gait entering and leaving therapy. challenged with eccentric step down with observed weakness   Improved stability on uneven surfaces with very min to no UE assistance. Unable to complete R SL heelraise at this time   ~50% full AR OM at this time.      Exercises: (No more than 4 columns)   Exercise/Equipment 8/13/21  #1 8/17/21 #2 8/23/2021 #3 8/27/21   #4            WARM UP          Nu step        R bike   3'  5'     Elliptical     3'                  TABLE       *Towel gastroc stretch  15\"x4 15\"x4 --                                   STANDING       *R lean DL heelraises  10x2      wobbleboard AP taps with R LE  10x2 10x2 15x2   FW Bwd stepping     33#  5x2   R SL heelraises on shuttle   2c 15x   1c 15x 1C 20x2 2c 15x2      2c 30x2 jogging    Anterior eccentric step down   6in 10x2 6\" 10x2 6\" 10x2   R SLS BOSU circles   10x2 each dir  10x2 ea dir 10x2 ea dir    FR calf stretch        FR 30\"x2 FR 20\"x4    FR 20\"x4 at end of tx                        PROPRIOCEPTION       *SLS 20\"x4 BOSU 20\"x3 BOSU 30\"x2 SLS BOSU 15\"x3   BOSU marches   30x2 20x2 30x2   airex step taps 12in 30x2 12\" cone     wobbleboard  30\"x2 both dir  30\"x2 both dir  Both dir 15x2 ball pass    Lateral step over BOSU  10x2 10x2 10x2   DLS on BOSU with mod perturbations and EC  20\"x2 each  20\" x2 DL  20\" x1 SL    BOSU squats     10x2                 MODALITIES                         Other Therapeutic Activities/Education:  --    Educated on benefits to initiating walking program with monitoring of pain to tolerance and weaning daily with tennis shoe to assist return to previous activity. Pt reported understanding. Home Exercise Program:  HO issued, reviewed and discussed with patient. Pt agreed to comply. Manual Treatments:  --      Modalities:  -- Declined       Communication with other providers:  POC sent 8/13/21      Assessment:  (Response towards treatment session) (Pain Rating) Pt tolerated treatment well. Tolerated progression in resistance and intensity. Fatigue as treatment progressed without increase in pain. Remains unable to complete full R ankle PF at this time. Improved stability on uneven surfaces as well with improved proprioception and righting reactions. Progressing well with plan of care. Will assess next visit and progress as toleratated     Pt rated pain unchanged at 0/10 \"fatigued\" . Pt will continue to benefit from more therapy to increase strength, ROM, and balance. Pt is 39year old male s/p R achilles lengthening, bone spur removal and plantar fasciotomy 6/4/21. Pt now has difficulties completing prolonged ambulation with tennis she and unable to return to work duties. Pt demo deficits this date that include R gastroc weakness, flexibility restrictions, balance stability and min/mod pain. Overall, pt progressing well with min deficits with high step count required for work duties with walking nearly continuously for hours. Pt will benefit with PT services with progression of strength/ROM, manual and modalties to return to PLOF.  Pt prior to onset of current condition had min/no pain with able to complete full ADLs and work activities. Patient received education on their current pathology and how their condition effects them with their functional activities. Patient understood discussion and questions were answered. Patient understands their activity limitations and understands rational for treatment progression. Plan for Next Session: Specific instructions for Next Treatment: review HEP, manual gastroc stretching, scar mobility, gastroc stretching, gastroc strengthening as able, uneven surfaces, gameready if needed.       Time In / Time Out:    3659-4776       If [facility] Please Indicate Time In/Out/Total Time  CPT Code Time in Time out Total Time                                                            Total for session             Timed Code/Total Treatment Minutes: 42/42'     1 neuro (15')   1 TE 10'    1  TA ( 17')     Next Progress Note due:  Olivaal 8/13/21   Visit 10       Plan of Care Interventions:  [x] Therapeutic Exercise  [x] Modalities:  [x] Therapeutic Activity     [] Ultrasound  [] Estim  [x] Gait Training      [] Cervical Traction [] Lumbar Traction  [x] Neuromuscular Re-education    [x] Cold/hotpack [] Iontophoresis   [x] Instruction in HEP      [x] Vasopneumatic   [] Dry Needling    [x] Manual Therapy               [] Aquatic Therapy              Electronically signed by:  Kaylen Parnell PT, DPT, OCS    8/27/2021 7:33 AM

## 2021-08-30 ENCOUNTER — HOSPITAL ENCOUNTER (OUTPATIENT)
Dept: PHYSICAL THERAPY | Age: 36
Setting detail: THERAPIES SERIES
Discharge: HOME | End: 2021-08-30
Payer: OTHER GOVERNMENT

## 2021-08-30 PROCEDURE — 97110 THERAPEUTIC EXERCISES: CPT

## 2021-08-30 PROCEDURE — 97112 NEUROMUSCULAR REEDUCATION: CPT

## 2021-08-30 PROCEDURE — 97530 THERAPEUTIC ACTIVITIES: CPT

## 2021-08-30 NOTE — FLOWSHEET NOTE
Outpatient Physical Therapy  [facility]           [x] Phone: [phone]   Fax: [phone]  Rosa Wu           [] Phone: [phone]   Fax: [phone]        Physical Therapy Daily Treatment Note  Date:  2021    Patient Name:  Olga Ordonez    :  1985  MRN: [id number]  Restrictions/Precautions:    Diagnosis:   Diagnosis: R ankle achillles lengthening, one spur removal, plantar fasciotomy   Date of Injury/Surgery: 21  Treatment Diagnosis: Treatment Diagnosis: R ankle pain, weakness, stiffness    Insurance/Certification information:    [facility]  Referring Physician:  Referring Practitioner: Dr. Makeda Copeland  Next Doctor Visit:  21  Plan of care signed (Y/N):   Yes  Outcome Measure: LEFS: 42/80   Visit# / total visits: 5 / 8 per POC  Pain level: 0  /10   Goals:     Patient goals : return to full mobility and no pain. Short term goals  Time Frame for Short term goals: Defer to 1200 [facility] term goals  Time Frame for Long term goals : 4 weeks 21  Long term goal 1: Pt will demo I with HEP/symptom management. Long term goal 2: Pt will demo >20 sec R SLS with min/no pain to demo improved balance. MET  Long term goal 3: Pt will report >60/80 per LEFS to demo improved function. Long term goal 4: Pt will demo R SL heelrasies >10 reps with min/no pain to demo improved strength/tolerance Progressing   Long term goal 5: Pt will demo >14 deg DF to assist with normal gait mechanics. Summary of Evaluation:   Pt is 39year old male s/p R achilles lengthening, bone spur removal and plantar fasciotomy 21. Pt now has difficulties completing prolonged ambulation with tennis she and unable to return to work duties. Pt demo deficits this date that include R gastroc weakness, flexibility restrictions, balance stability and min/mod pain. Overall, pt progressing well with min deficits with high step count required for work duties with walking nearly continuously for hours.  Pt will benefit with PT services with progression of strength/ROM, manual and modalties to return to PLOF. Pt prior to onset of current condition had min/no pain with able to complete full ADLs and work activities. Patient received education on their current pathology and how their condition effects them with their functional activities. Patient understood discussion and questions were answered. Patient understands their activity limitations and understands rational for treatment progression. Subjective:  Pt stated that he wasn't having any pain today,but that he was sore after last visit. Pt stated that he is to RTW 9/7/2021 with 3 hours of walking to start out, but it could be less if needed. Pt stated that he just had to contact his doctor and let him know and it could be decreased. Any changes in Ambulatory Summary Sheet? None        Objective:     COVID screening questions were asked and patient attested that there had been no contact or symptoms    Pt had improved balance on SLS today  Pt was able to lift heel on R from floor,but not complete SLS heel lift  .      Exercises: (No more than 4 columns)   Exercise/Equipment 8/17/21 #2 8/23/2021 #3 8/27/21   #4 8/30/2021 #5            WARM UP          Nu step        R bike  3'  5'      Elliptical    3'  3'                 TABLE       *Towel gastroc stretch  15\"x4 --                                    STANDING       *R lean DL heelraises        wobbleboard AP taps with R LE 10x2 10x2 15x2 15 x2    FW Bwd stepping    33#  5x2 33# 5x2   R SL heelraises on shuttle  2c 15x   1c 15x 1C 20x2 2c 15x2      2c 30x2 jogging  2c 15x2      2c 30x2 jogging   Anterior eccentric step down  6in 10x2 6\" 10x2 6\" 10x2 6\" lat step down 10x2  6\" ant step down 10x2   R SLS BOSU circles  10x2 each dir  10x2 ea dir 10x2 ea dir 10x2 ea dir    FR calf stretch       FR 30\"x2 FR 20\"x4    FR 20\"x4 at end of tx       FR 20\"x4 at end of tx                        PROPRIOCEPTION       *SLS BOSU 20\"x3 BOSU 30\"x2 SLS BOSU 15\"x3 SLS BOSU 30\"x2   BOSU marches  30x2 20x2 30x2 30x2   airex step taps  12in 30x2 12\" cone      wobbleboard 30\"x2 both dir  30\"x2 both dir  Both dir 15x2 ball pass  Both dir 15x2 ball pass    Lateral step over BOSU 10x2 10x2 10x2 10x2   DLS on BOSU with mod perturbations and EC 20\"x2 each  20\" x2 DL  20\" x1 SL  30\" x2 DL  30\" x1 SL   BOSU squats    10x2 10x2                 MODALITIES                         Other Therapeutic Activities/Education:  --    Educated on benefits to initiating walking program with monitoring of pain to tolerance and weaning daily with tennis shoe to assist return to previous activity. Pt reported understanding. Home Exercise Program:  HO issued, reviewed and discussed with patient. Pt agreed to comply. Manual Treatments:  --      Modalities:  -- Declined       Communication with other providers:  POC sent 8/13/21      Assessment:  (Response towards treatment session) (Pain Rating) Pt with a good tolerance towards today's treatment session. Pt able to complete all activities without an exacerbation of symptoms. Pt would continue to benefit from skilled therapy interventions to address remaining impairments, improve mobility and strength and progress toward goal completion while reducing risk for re-injury or further decline. Pt is 39year old male s/p R achilles lengthening, bone spur removal and plantar fasciotomy 6/4/21. Pt now has difficulties completing prolonged ambulation with tennis she and unable to return to work duties. Pt demo deficits this date that include R gastroc weakness, flexibility restrictions, balance stability and min/mod pain. Overall, pt progressing well with min deficits with high step count required for work duties with walking nearly continuously for hours. Pt will benefit with PT services with progression of strength/ROM, manual and modalties to return to PLOF.  Pt prior to onset of current condition had min/no pain with able to complete full ADLs and work activities. Patient received education on their current pathology and how their condition effects them with their functional activities. Patient understood discussion and questions were answered. Patient understands their activity limitations and understands rational for treatment progression. Plan for Next Session: Specific instructions for Next Treatment: review HEP, manual gastroc stretching, scar mobility, gastroc stretching, gastroc strengthening as able, uneven surfaces, gameready if needed.       Time In / Time Out:  0913/ 0955       If [facility] Please Indicate Time In/Out/Total Time  CPT Code Time in Time out Total Time                                                            Total for session             Timed Code/Total Treatment Minutes: 42/42'     1 neuro (15')   1 TE (10')    1  TA ( 17')     Next Progress Note due:  Olivaal 8/13/21   Visit 10       Plan of Care Interventions:  [x] Therapeutic Exercise  [x] Modalities:  [x] Therapeutic Activity     [] Ultrasound  [] Estim  [x] Gait Training      [] Cervical Traction [] Lumbar Traction  [x] Neuromuscular Re-education    [x] Cold/hotpack [] Iontophoresis   [x] Instruction in HEP      [x] Vasopneumatic   [] Dry Needling    [x] Manual Therapy               [] Aquatic Therapy              Electronically signed by:  Melissa Adler  8/30/2021 8:42 AM      8/30/2021,1:40 PM

## 2021-09-07 ENCOUNTER — HOSPITAL ENCOUNTER (OUTPATIENT)
Dept: PHYSICAL THERAPY | Age: 36
Setting detail: THERAPIES SERIES
Discharge: HOME | End: 2021-09-07
Payer: OTHER GOVERNMENT

## 2021-09-07 PROCEDURE — 97140 MANUAL THERAPY 1/> REGIONS: CPT

## 2021-09-07 PROCEDURE — 97112 NEUROMUSCULAR REEDUCATION: CPT

## 2021-09-07 PROCEDURE — 97110 THERAPEUTIC EXERCISES: CPT

## 2021-09-07 NOTE — FLOWSHEET NOTE
Outpatient Physical Therapy  [facility]           [x] Phone: [phone]   Fax: [phone]  Dominique Haines           [] Phone: [phone]   Fax: [phone]        Physical Therapy Daily Treatment Note  Date:  2021    Patient Name:  Tracy Cantor    :  1985  MRN: [id number]  Restrictions/Precautions:    Diagnosis:   Diagnosis: R ankle achillles lengthening, one spur removal, plantar fasciotomy   Date of Injury/Surgery: 21  Treatment Diagnosis: Treatment Diagnosis: R ankle pain, weakness, stiffness    Insurance/Certification information:    [facility]  Referring Physician:  Referring Practitioner: Dr. Sandra Gill  Next Doctor Visit:  21  Plan of care signed (Y/N):   Yes  Outcome Measure: LEFS: 42/80   Visit# / total visits: 6/ 8 per POC  Pain level:   0 /10   Goals:      Patient goals : return to full mobility and no pain. Short term goals  Time Frame for Short term goals: Defer to [address] [facility] term goals  Time Frame for Long term goals : 4 weeks 21  Long term goal 1: Pt will demo I with HEP/symptom management. Long term goal 2: Pt will demo >20 sec R SLS with min/no pain to demo improved balance. MET  Long term goal 3: Pt will report >60/80 per LEFS to demo improved function. Long term goal 4: Pt will demo R SL heelrasies >10 reps with min/no pain to demo improved strength/tolerance Progressing   Long term goal 5: Pt will demo >14 deg DF to assist with normal gait mechanics. Summary of Evaluation:   Pt is 39year old male s/p R achilles lengthening, bone spur removal and plantar fasciotomy 21. Pt now has difficulties completing prolonged ambulation with tennis she and unable to return to work duties. Pt demo deficits this date that include R gastroc weakness, flexibility restrictions, balance stability and min/mod pain. Overall, pt progressing well with min deficits with high step count required for work duties with walking nearly continuously for hours.  Pt will benefit with PT services with progression of strength/ROM, manual and modalties to return to PLOF. Pt prior to onset of current condition had min/no pain with able to complete full ADLs and work activities. Patient received education on their current pathology and how their condition effects them with their functional activities. Patient understood discussion and questions were answered. Patient understands their activity limitations and understands rational for treatment progression. Subjective:  Pt stated that he wasn't having any pain today,but that he was sore after last visit. Pt stated that he is to RTW 9/7/2021 with 3 hours of walking to start out,with able to call to change restrictions if needed. Any changes in Ambulatory Summary Sheet? None        Objective:     COVID screening questions were asked and patient attested that there had been no contact or symptoms    Pt had improved balance on SLS today  Pt able to lift heel on R from floor but not full. Min scar mobility deficits with improvement after man. Min skin slough mid incision.    .     Exercises: (No more than 4 columns)   Exercise/Equipment 8/27/21   #4 8/30/2021 #5 9/7/21   #6           WARM UP         Nu step       R bike       Elliptical  3'  3' 3'               TABLE      *Towel gastroc stretch                                  STANDING      *R lean DL heelraises       wobbleboard AP taps with R LE 15x2 15 x2  15x2   FW Bwd stepping  33#  5x2 33# 5x2    R SL heelraises on shuttle  2c 15x2      2c 30x2 jogging  2c 15x2      2c 30x2 jogging    Anterior eccentric step down  6\" 10x2 6\" lat step down 10x2  6\" ant step down 10x2    R SLS BOSU circles  10x2 ea dir 10x2 ea dir     FR calf stretch      FR 20\"x4    FR 20\"x4 at end of tx       FR 20\"x4 at end of tx FR 20\"x4 at end of tx   HR on slant board    10x               PROPRIOCEPTION      *SLS SLS BOSU 15\"x3 SLS BOSU 30\"x2 BOSU 20\"x3   BOSU marches  30x2 30x2 30x2   airex step taps wobbleboard Both dir 15x2 ball pass  Both dir 15x2 ball pass  AP  30\"x3   Lateral step over BOSU 10x2 10x2    DLS on BOSU with mod perturbations and EC  30\" x2 DL  30\" x1 SL    BOSU squats  10x2 10x2    Tandem stance on FR and BOSU   15\"X2 each          MODALITIES                      Other Therapeutic Activities/Education:  --    Educated on benefits to initiating walking program with monitoring of pain to tolerance and weaning daily with tennis shoe to assist return to previous activity. Pt reported understanding. Home Exercise Program:  HO issued, reviewed and discussed with patient. Pt agreed to comply. Manual Treatments: man scar mobilization in prone, sensitive at first with improvement. Improvement in pain per patient with improved mobility x10'. Modalities:  -- Declined       Communication with other providers:  POC sent 8/13/21      Assessment:  (Response towards treatment session) (Pain Rating) Pt with a good tolerance towards today's treatment session. Pt able to complete all activities without an exacerbation of symptoms. Improvement in strength with nearly able to complete full R SL heelraise. Improvement at scar with man. Returns to work this date to assess tolerance. Will return on Thursday with possible d/c or being placed on hold. Pt would continue to benefit from skilled therapy interventions to address remaining impairments, improve mobility and strength and progress toward goal completion while reducing risk for re-injury or further decline. Pt is 39year old male s/p R achilles lengthening, bone spur removal and plantar fasciotomy 6/4/21. Pt now has difficulties completing prolonged ambulation with tennis she and unable to return to work duties. Pt demo deficits this date that include R gastroc weakness, flexibility restrictions, balance stability and min/mod pain.   Overall, pt progressing well with min deficits with high step count required for work duties with walking nearly continuously for hours. Pt will benefit with PT services with progression of strength/ROM, manual and modalties to return to PLOF. Pt prior to onset of current condition had min/no pain with able to complete full ADLs and work activities. Patient received education on their current pathology and how their condition effects them with their functional activities. Patient understood discussion and questions were answered. Patient understands their activity limitations and understands rational for treatment progression. Plan for Next Session: Specific instructions for Next Treatment: review HEP, manual gastroc stretching, scar mobility, gastroc stretching, gastroc strengthening as able, uneven surfaces, gameready if needed.       Time In / Time Out:  9359-5619       If [facility] Please Indicate Time In/Out/Total Time  CPT Code Time in Time out Total Time                                                            Total for session             Timed Code/Total Treatment Minutes: 39/39'         1 neuro (15')   1 TE (14')    1  Man  8'     Next Progress Note due:  Lg 8/13/21   Visit 10       Plan of Care Interventions:  [x] Therapeutic Exercise  [x] Modalities:  [x] Therapeutic Activity     [] Ultrasound  [] Estim  [x] Gait Training      [] Cervical Traction [] Lumbar Traction  [x] Neuromuscular Re-education    [x] Cold/hotpack [] Iontophoresis   [x] Instruction in HEP      [x] Vasopneumatic   [] Dry Needling    [x] Manual Therapy               [] Aquatic Therapy              Electronically signed by:  Adalberto Edwards, PT, DPT, OCS    9/7/2021 7:05 AM

## 2021-09-09 ENCOUNTER — HOSPITAL ENCOUNTER (OUTPATIENT)
Dept: PHYSICAL THERAPY | Age: 36
Setting detail: THERAPIES SERIES
Discharge: HOME | End: 2021-09-09
Payer: OTHER GOVERNMENT

## 2021-09-09 PROCEDURE — 97112 NEUROMUSCULAR REEDUCATION: CPT

## 2021-09-09 PROCEDURE — 97110 THERAPEUTIC EXERCISES: CPT

## 2021-09-09 PROCEDURE — 97530 THERAPEUTIC ACTIVITIES: CPT

## 2021-09-09 NOTE — PROGRESS NOTES
Outpatient Physical Therapy           [place]           [] Phone: [phone]   Fax: [phone]  [place]           [] Phone: [phone]   Fax: [phone]      To:    Dr. Batista Care           From: Dylan Sesay, PT, DPT, OCS    Patient: Juan Luis Krause                       : 1985  Diagnosis:     R ankle achillles lengthening, one spur removal, plantar fasciotomy   Date: 2021  Treatment Diagnosis:   R ankle pain, weakness, stiffness         [x]  Progress Note                []  Discharge Note    Evaluation Date:  21   Total Visits to date:   7 Cancels/No-shows to date: 0     Subjective: Pt stated that he wasn't having any pain today and wasn't sore after last visit. Pt did not have to go into work until this following Monday. Pt stated that he is to RTW 2021 with 3 hours of walking to start out,with able to call to change restrictions if needed. Feels ready to continue independently. Feels 100% improved improved. Plan of Care/Treatment to date:  [x] Therapeutic Exercise    [x] Modalities:  [x] Therapeutic Activity     [] Ultrasound  [] Electrical Stimulation  [x] Gait Training      [] Cervical Traction   [] Lumbar Traction  [x] Neuromuscular Re-education  [x] Cold/hotpack [] Iontophoresis  [x] Instruction in HEP      Other:  [x] Manual Therapy       [x]  Vasopneumatic  [] Aquatic Therapy       []   Dry Needle Therapy                      Objective/Significant Findings At Last Visit/Comments:    Min tenderness directly to posterior calcaneus   DF: 12 deg AROM   Able to complete heel/toe walking with 4/10 pain with toe walking but resolves  R SLS: 40 sec      Eyes closed: 8 sec  Foam: 16 sec    Able to complete ~75% full AR OM with increase in discomfort with each repetition      Normal scar mobility deficits    6MWT: 1260ft with no pain post.      LEFS: 64/80 full function. Assessment:   Beverly Dempsey has completed 7 visits since start of therapy on 21.  Improvement in pain, balance, flexibility and tolerance since start of therapy. Lacks full strength with inability to complete full single leg heel raise at this time with min pain with quick to resolve. Ambulated 6 min in clinic at appropriate pace without any pain. Will place on hold to return if indicated. Will discharge with home program to further improve prior to return to work next week. Will discharge in 30 days if no return follow up is indicated. Pt reported understanding. Goal Status:  [x] Achieved [] Partially Achieved  [] Not Achieved     Patient goals : return to full mobility and no pain. Mostly MET  Short term goals  Time Frame for Short term goals: Defer to [address] [facility] term goals  Time Frame for Long term goals : 4 weeks 9/13/21  Long term goal 1: Pt will demo I with HEP/symptom management. MET  Long term goal 2: Pt will demo >20 sec R SLS with min/no pain to demo improved balance. MET  Long term goal 3: Pt will report >60/80 per LEFS to demo improved function. MET  Long term goal 4: Pt will demo R SL heelrasies >10 reps with min/no pain to demo improved strength/tolerance Not MET  Long term goal 5: Pt will demo >14 deg DF to assist with normal gait mechanics. Mostly MET         Patient Status: [x] Continue per initial plan of Care, 30 day hold     [] Patient now discharged     [] Additional visits requested, Please re-certify for additional visits: If we are requesting more visits, we fully anticipate the patient's condition is expected to improve within the treatment timeframe we are requesting. Electronically signed by:  Rubina Ralph PT, DPT, OCS  9/9/2021, 6:27 AM    9/9/2021 6:27 AM     If you have any questions or concerns, please don't hesitate to call.   Thank you for your referral.    Physician Signature:______________________ Date:______ Time: ________  By signing above, therapists plan is approved by physician

## 2021-09-09 NOTE — FLOWSHEET NOTE
Outpatient Physical Therapy  [place]           [x] Phone: [phone]   Fax: [phone]  [place] [place]           [] Phone: [phone]   Fax: [phone]        Physical Therapy Daily Treatment Note  Date:  2021    Patient Name:  Merle Valenzuela    :  1985  MRN: [id number]  Restrictions/Precautions:    Diagnosis:   Diagnosis: R ankle achillles lengthening, one spur removal, plantar fasciotomy   Date of Injury/Surgery: 21  Treatment Diagnosis: Treatment Diagnosis: R ankle pain, weakness, stiffness    Insurance/Certification information:    [facility]  Referring Physician:  Referring Practitioner: Dr. Ivelisse Hodge  Next Doctor Visit:  21  Plan of care signed (Y/N):   Yes  Outcome Measure: LEFS: 42/80   Visit# / total visits: 7/ 8 per POC  Pain level:    0/10   Goals:      Patient goals : return to full mobility and no pain. Short term goals  Time Frame for Short term goals: Defer to [address] [facility] term goals  Time Frame for Long term goals : 4 weeks 21  Long term goal 1: Pt will demo I with HEP/symptom management. MET  Long term goal 2: Pt will demo >20 sec R SLS with min/no pain to demo improved balance. MET  Long term goal 3: Pt will report >60/80 per LEFS to demo improved function. MET  Long term goal 4: Pt will demo R SL heelrasies >10 reps with min/no pain to demo improved strength/tolerance Not MET  Long term goal 5: Pt will demo >14 deg DF to assist with normal gait mechanics. Mostly MET    Summary of Evaluation:   Pt is 39year old male s/p R achilles lengthening, bone spur removal and plantar fasciotomy 21. Pt now has difficulties completing prolonged ambulation with tennis she and unable to return to work duties. Pt demo deficits this date that include R gastroc weakness, flexibility restrictions, balance stability and min/mod pain. Overall, pt progressing well with min deficits with high step count required for work duties with walking nearly continuously for hours.  Pt will benefit with PT services with progression of strength/ROM, manual and modalties to return to PLOF. Pt prior to onset of current condition had min/no pain with able to complete full ADLs and work activities. Patient received education on their current pathology and how their condition effects them with their functional activities. Patient understood discussion and questions were answered. Patient understands their activity limitations and understands rational for treatment progression. Subjective:  Pt stated that he wasn't having any pain today and wasn't sore after last visit. Pt did not have to go into work until this following Monday. Pt stated that he is to RTW 9/14/2021 with 3 hours of walking to start out,with able to call to change restrictions if needed. Feels ready to continue independently. Feels 100% improved improved. Any changes in Ambulatory Summary Sheet? None        Objective:     COVID screening questions were asked and patient attested that there had been no contact or symptoms    Min tenderness directly to posterior calcaneus   DF: 12 deg AROM   Able to complete heel/toe walking with 4/10 pain with toe walking but resolves  R SLS: 40 sec      Eyes closed: 8 sec  Foam: 16 sec    Able to complete ~75% full AR OM with increase in discomfort with each repetition      Normal scar mobility deficits    6MWT: 1260ft with no pain post.     LEFS: 64/80 full function.    .     Exercises: (No more than 4 columns)   Exercise/Equipment 8/27/21   #4 8/30/2021 #5 9/7/21   #6 9/9/21  #7            WARM UP          Nu step        R bike        Elliptical  3'  3' 3' 3'                 TABLE       *Towel gastroc stretch                                       STANDING       *R lean DL heelraises        wobbleboard AP taps with R LE 15x2 15 x2  15x2    FW Bwd stepping  33#  5x2 33# 5x2  47# 5x   bwd     27# fwd  5x   R SL heelraises on shuttle  2c 15x2      2c 30x2 jogging  2c 15x2      2c 30x2 jogging Anterior eccentric step down  6\" 10x2 6\" lat step down 10x2  6\" ant step down 10x2     R SLS BOSU circles  10x2 ea dir 10x2 ea dir      FR calf stretch      FR 20\"x4    FR 20\"x4 at end of tx       FR 20\"x4 at end of tx FR 20\"x4 at end of tx    HR on slant board    10x                  PROPRIOCEPTION       *SLS SLS BOSU 15\"x3 SLS BOSU 30\"x2 BOSU 20\"x3 DLS with perturbations on BOSU 20x2   BOSU marches  30x2 30x2 30x2 30x2   airex step taps        wobbleboard Both dir 15x2 ball pass  Both dir 15x2 ball pass  AP  30\"x3 Lateral 20\"x3     AP ball pass 15x2   Lateral step over BOSU 10x2 10x2  10x2   DLS on BOSU with mod perturbations and EC  30\" x2 DL  30\" x1 SL     BOSU squats  10x2 10x2     Tandem stance on FR and BOSU   15\"X2 each            MODALITIES                         Other Therapeutic Activities/Education:  --    Educated on benefits to initiating walking program with monitoring of pain to tolerance and weaning daily with tennis shoe to assist return to previous activity. Pt reported understanding. Home Exercise Program:  HO issued, reviewed and discussed with patient. Pt agreed to comply. Manual Treatments:     Modalities:  -- Declined       Communication with other providers:  POC sent 8/13/21   PN completed 9/9/21      Assessment:  (Response towards treatment session) (Pain Rating) Anthony Jason has completed 7 visits since start of therapy on 8/13/21. Improvement in pain, balance, flexibility and tolerance since start of therapy. Lacks full strength with inability to complete full single leg heel raise at this time with min pain with quick to resolve. Ambulated 6 min in clinic at appropriate pace without any pain. Will place on hold to return if indicated. Will discharge with home program to further improve prior to return to work next week. Will discharge in 30 days if no return follow up is indicated. Pt reported understanding.         Pt is 39year old male s/p R achilles lengthening, bone spur removal and plantar fasciotomy 6/4/21. Pt now has difficulties completing prolonged ambulation with tennis she and unable to return to work duties. Pt demo deficits this date that include R gastroc weakness, flexibility restrictions, balance stability and min/mod pain. Overall, pt progressing well with min deficits with high step count required for work duties with walking nearly continuously for hours. Pt will benefit with PT services with progression of strength/ROM, manual and modalties to return to PLOF. Pt prior to onset of current condition had min/no pain with able to complete full ADLs and work activities. Patient received education on their current pathology and how their condition effects them with their functional activities. Patient understood discussion and questions were answered. Patient understands their activity limitations and understands rational for treatment progression.         Time In / Time Out:  2072-6867       If [facility] Please Indicate Time In/Out/Total Time  CPT Code Time in Time out Total Time                                                            Total for session             Timed Code/Total Treatment Minutes  44/44'          1 neuro (15')   1 TE (14')    1 TA 15'     Next Progress Note due:  Eval 8/13/21   Visit 10       Plan of Care Interventions:  [x] Therapeutic Exercise  [x] Modalities:  [x] Therapeutic Activity     [] Ultrasound  [] Estim  [x] Gait Training      [] Cervical Traction [] Lumbar Traction  [x] Neuromuscular Re-education    [x] Cold/hotpack [] Iontophoresis   [x] Instruction in HEP      [x] Vasopneumatic   [] Dry Needling    [x] Manual Therapy               [] Aquatic Therapy              Electronically signed by:  Dylan Sesay PT, DPT, OCS    9/9/2021 6:26 AM

## 2021-10-13 NOTE — DISCHARGE SUMMARY
Outpatient Physical Therapy           [place]           [] Phone: [phone]   Fax: [phone]  [place]           [] Phone: [phone]   Fax: [phone]      To:    Dr. Sadia Barnett           From: Marisue Denver, PT, DPT, OCS    Patient: Julieta Gordon                       : 1985  Diagnosis:     R ankle achillles lengthening, one spur removal, plantar fasciotomy   Date: 10/13/2021  Treatment Diagnosis:   R ankle pain, weakness, stiffness         []  Progress Note                [x]  Discharge Note    Evaluation Date:  21   Total Visits to date:   7 Cancels/No-shows to date: 0     Subjective: Per note on 21  Pt stated that he wasn't having any pain today and wasn't sore after last visit. Pt did not have to go into work until this following Monday. Pt stated that he is to RTW 2021 with 3 hours of walking to start out,with able to call to change restrictions if needed. Feels ready to continue independently. Feels 100% improved improved. Plan of Care/Treatment to date:  [x] Therapeutic Exercise    [x] Modalities:  [x] Therapeutic Activity     [] Ultrasound  [] Electrical Stimulation  [x] Gait Training      [] Cervical Traction   [] Lumbar Traction  [x] Neuromuscular Re-education  [x] Cold/hotpack [] Iontophoresis  [x] Instruction in HEP      Other:  [x] Manual Therapy       [x]  Vasopneumatic  [] Aquatic Therapy       []   Dry Needle Therapy                      Objective/Significant Findings At Last Visit/Comments:   Per note on 21  Min tenderness directly to posterior calcaneus   DF: 12 deg AROM   Able to complete heel/toe walking with 4/10 pain with toe walking but resolves  R SLS: 40 sec      Eyes closed: 8 sec  Foam: 16 sec    Able to complete ~75% full AR OM with increase in discomfort with each repetition      Normal scar mobility deficits    6MWT: 1260ft with no pain post.      LEFS: 64/80 full function.      Assessment:   Lani Rodriguez has completed 7 visits since start of therapy on 8/13/21. Improvement in pain, balance, flexibility and tolerance since start of therapy. Lacks full strength with inability to complete full single leg heel raise at this time with min pain with quick to resolve. Ambulated 6 min in clinic at appropriate pace without any pain. Will place on hold to return if indicated. Will discharge with home program to further improve prior to return to work next week. Will discharge with no return follow up in >30 days. Goal Status:  [x] Achieved [] Partially Achieved  [] Not Achieved     Patient goals : return to full mobility and no pain. Mostly MET  Short term goals  Time Frame for Short term goals: Defer to [address] [facility] term goals  Time Frame for Long term goals : 4 weeks 9/13/21  Long term goal 1: Pt will demo I with HEP/symptom management. MET  Long term goal 2: Pt will demo >20 sec R SLS with min/no pain to demo improved balance. MET  Long term goal 3: Pt will report >60/80 per LEFS to demo improved function. MET  Long term goal 4: Pt will demo R SL heelrasies >10 reps with min/no pain to demo improved strength/tolerance Not MET  Long term goal 5: Pt will demo >14 deg DF to assist with normal gait mechanics. Mostly MET         Patient Status: [] Continue per initial plan of Care, 30 day hold     [x] Patient now discharged     [] Additional visits requested, Please re-certify for additional visits: If we are requesting more visits, we fully anticipate the patient's condition is expected to improve within the treatment timeframe we are requesting. Electronically signed by:  Keshia Roque, PT, DPT, OCS  10/13/2021, 11:27 AM    10/13/2021 11:27 AM     If you have any questions or concerns, please don't hesitate to call.   Thank you for your referral.    Physician Signature:______________________ Date:______ Time: ________  By signing above, therapists plan is approved by physician

## 2023-07-17 ENCOUNTER — HOSPITAL ENCOUNTER (OUTPATIENT)
Dept: PHYSICAL THERAPY | Age: 38
Setting detail: THERAPIES SERIES
Discharge: HOME | End: 2023-07-17
Payer: OTHER GOVERNMENT

## 2023-07-17 PROCEDURE — 97162 PT EVAL MOD COMPLEX 30 MIN: CPT

## 2023-07-17 PROCEDURE — 97110 THERAPEUTIC EXERCISES: CPT

## 2023-07-17 NOTE — PROGRESS NOTES
Physical Therapy: Initial Evaluation    Patient: Bianca Harden (89 y.o. male)   Examination Date:   Plan of Care Certification Period: 2023 to        :  1985 ;    Confirmed: Yes MRN: [id number]  CSN: [id number]   Insurance: Payor: [employer] / Plan: [employer] / Product Type: *No Product type* /   Insurance ID: [id number] - (Commercial) Secondary Insurance (if applicable): MARYJANE   Referring Physician: Coco Bell DO     PCP: Sherryle Salvo, APRN - NP Visits to Date/Visits Approved:   /      No Show/Cancelled Appts:   /       Medical Diagnosis: Incomplete rotator cuff tear or rupture of right shoulder, not specified as traumatic [M75.111]    Treatment Diagnosis: s/p R shoulder rotator cuff and bicep repair     PERTINENT MEDICAL HISTORY   Patient Assessed for Rehabilitation Services: Yes       Medical History: Chart Reviewed: Yes No past medical history on file. Surgical History:   Past Surgical History:   Procedure Laterality Date    ACHILLES TENDON SURGERY Right 2021    RIGHT SECONDARY ACHILLES TENDON LENGTHENING REPAIR, RIGHT RESECTION OF POSTERIOR SPUR performed by Josiah Vicente DPM at [facility] Right 2021    1. RIGHT SECONDARY ACHILLES TENDON LENGTHENING REPAIR, RIGHT RESECTION OF POSTERIOR SPUR 2. RIGHT PLANTAR FASCIA FASCIOTOMY ENDOSCOPIC    LASIK      pt states has had lasik surgery    PLANTAR FASCIA SURGERY Right 2021    RIGHT PLANTAR FASCIOTOMY RELEASE ENDOSCOPIC performed by Josiah Vicente DPM at [facility]       Medications:   Current Outpatient Medications:     ibuprofen (ADVIL;MOTRIN) 200 MG tablet, Take 200 mg by mouth every 6 hours as needed for Pain Pt states takes 3 every 6 hours. , Disp: , Rfl:     oxyCODONE-acetaminophen (PERCOCET) 5-325 MG per tablet, Take 1 tablet by mouth every 6 hours as needed for Pain.  To start after surgery on 21, Disp: , Rfl:

## 2023-07-17 NOTE — PLAN OF CARE
Outpatient Physical Therapy           [place]           [x] Phone: [phone]   Fax: [phone]  [place] [place]           [] Phone: [phone]   Fax: [phone]     To: Blank Lujan*     From: Merlin Peek, PT, DPT     Patient: Servando Kaiser       : 1985  Diagnosis: Incomplete rotator cuff tear or rupture of right shoulder, not specified as traumatic [M75.111]    Treatment Diagnosis: s/p R shoulder rotator cuff and bicep repair  Date: 2023    Physical Therapy Certification/Re-Certification Form  Dear Dr. Tonya Walker,   The following patient has been evaluated for physical therapy services and for therapy to continue, insurance requires physician review of the treatment plan initially and every 90 days. Please review the attached evaluation and/or summary of the patient's plan of care, and verify that you agree therapy should continue by signing the attached document and sending it back to our office. Assessment:    Assessment: Pt is a 44 yo male that presents to therapy s/p R rotator cuff and biceps repair on 23. He was injured when starting a  that locked up on him. a few days before surgery he was attacked by a dog and was shaken around, worsening his current injury to R shoulder. 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 Pt demo impaired RUE ROM, strength, activity tolerance, functional mobility and increased pain. he is able to complete all adls independently and is in the process of discharging sling. He is a  and is currently on light duty restrictions, with no confirmed date of returning to full duty yet. Pt would benefit from continued skilled physical therapy to address impairments and limitations, progress toward goal completion, promote independence with ADLs, and prevent further injury. Patient agrees with established plan of care and assisted in the development of their short term and long term goals.  Patient

## 2023-07-19 NOTE — PLAN OF CARE
Patients Plan of Care was received and signed. Signed POC was scanned and placed in the patients chart.     Campbell Rojas

## 2023-07-20 ENCOUNTER — HOSPITAL ENCOUNTER (OUTPATIENT)
Dept: PHYSICAL THERAPY | Age: 38
Setting detail: THERAPIES SERIES
Discharge: HOME | End: 2023-07-20
Payer: OTHER GOVERNMENT

## 2023-07-20 PROCEDURE — 97140 MANUAL THERAPY 1/> REGIONS: CPT

## 2023-07-20 PROCEDURE — 97110 THERAPEUTIC EXERCISES: CPT

## 2023-07-20 NOTE — FLOWSHEET NOTE
Outpatient Physical Therapy  [place]           [x] Phone: [phone]   Fax: [phone]  [facility]           [] Phone: [phone]   Fax: [phone]        Physical Therapy Daily Treatment Note  Date:  2023    Patient Name:  Anthony Bonner  \"JUAN\"   :  1985  MRN: [id number]  Restrictions/Precautions: Restrictions/Precautions: General Precautions; Surgical Protocols   [facility] rehab protocol in paper chart      Diagnosis:   Incomplete rotator cuff tear or rupture of right shoulder, not specified as traumatic [M75.111]    Date of Surgery: 23  Treatment Diagnosis:  s/p R shoulder rotator cuff and bicep repair  Insurance/Certification information: Marie Burks- [employer] [facility]/OT/ST  Referring Physician:  Jamal Pennington*     PCP: LYNDA River NP  Next Doctor Visit:    Plan of care signed (Y/N):  sent   Outcome Measure: QD: 79%  Visit# / total visits:    Pain level: 0/10   Goals:     Patient goals: return to PLOF  Short term goals  Time Frame for Short term goals: 5 visits  pt will improve R shoulder PROM flexion to 120 deg for improved active movements  pt will improve R shoulder PROM abduction to 120 deg for improved active movements  pt will improve R shoulder AAROM flexion to 100 deg for improved active movements  Pt will report overall improvement in condition by 25% or more  pt will improve QD to 70% or less to show MDC and subjective improvement  Long Term Goals  Time Frame for Long Term Goals: 10 visits  Pt will report overall improvement in condition by 50% or more  pt will improve QD to 50% or less to show MDC and subjective improvement  pt will improve R shoulder flexion AROM to 160 deg to be able to reach into kitchen cabinets  pt will improve R shoulder flexion strength to 4/5 for improved lifting for work activities  pt will improve R shoulder ER AROM to 60 deg for washing his hair      Summary of Evaluation:  Assessment: Pt is a 46 yo male that presents to

## 2023-07-25 ENCOUNTER — HOSPITAL ENCOUNTER (OUTPATIENT)
Dept: PHYSICAL THERAPY | Age: 38
Setting detail: THERAPIES SERIES
Discharge: HOME | End: 2023-07-25
Payer: OTHER GOVERNMENT

## 2023-07-25 PROCEDURE — 97110 THERAPEUTIC EXERCISES: CPT

## 2023-07-25 PROCEDURE — 97140 MANUAL THERAPY 1/> REGIONS: CPT

## 2023-07-25 NOTE — FLOWSHEET NOTE
progress per protocol    Time In / Time Out:     1610 / 1640      Timed Code/Total Treatment Minutes:   30'/30'   1 TE   1 Man       Next Progress Note due:  10th visit       Plan of Care Interventions:  [x] Therapeutic Exercise  [x] Modalities:  [x] Therapeutic Activity     [] Ultrasound  [x] Estim  [] Gait Training      [] Cervical Traction [] Lumbar Traction  [x] Neuromuscular Re-education    [] Cold/hotpack [] Iontophoresis   [x] Instruction in HEP      [x] Vasopneumatic   [] Dry Needling    [x] Manual Therapy               [] Aquatic Therapy              Electronically signed by:  Paolo Johnston PTA,    7/25/2023, 4:07 PM

## 2023-07-31 ENCOUNTER — HOSPITAL ENCOUNTER (OUTPATIENT)
Dept: PHYSICAL THERAPY | Age: 38
Setting detail: THERAPIES SERIES
Discharge: HOME | End: 2023-07-31
Payer: OTHER GOVERNMENT

## 2023-07-31 PROCEDURE — 97140 MANUAL THERAPY 1/> REGIONS: CPT

## 2023-07-31 PROCEDURE — 97110 THERAPEUTIC EXERCISES: CPT

## 2023-07-31 NOTE — FLOWSHEET NOTE
Outpatient Physical Therapy  [place]           [x] Phone: [phone]   Fax: [phone]  [facility]           [] Phone: [phone]   Fax: [phone]        Physical Therapy Daily Treatment Note  Date:  2023    Patient Name:  Jewel Diaz  \"JUAN\"   :  1985  MRN: [id number]  Restrictions/Precautions: Restrictions/Precautions: General Precautions; Surgical Protocols   [facility] rehab protocol in paper chart      Diagnosis:   Incomplete rotator cuff tear or rupture of right shoulder, not specified as traumatic [M75.111]    Date of Surgery: 23  Treatment Diagnosis:  s/p R shoulder rotator cuff and bicep repair  [facility]  Referring Physician:  Corina Zimmerman*     PCP: LYNDA Macias NP  Next Doctor Visit:    Plan of care signed (Y/N):  sent   Outcome Measure: QD: 79%  Visit# / total visits:    Pain level: 4-5/10  With motion  Goals:     Patient goals: return to PLOF  Short term goals  Time Frame for Short term goals: 5 visits  pt will improve R shoulder PROM flexion to 120 deg for improved active movements  pt will improve R shoulder PROM abduction to 120 deg for improved active movements  pt will improve R shoulder AAROM flexion to 100 deg for improved active movements  Pt will report overall improvement in condition by 25% or more  pt will improve QD to 70% or less to show MDC and subjective improvement  Long Term Goals  Time Frame for Long Term Goals: 10 visits  Pt will report overall improvement in condition by 50% or more  pt will improve QD to 50% or less to show MDC and subjective improvement  pt will improve R shoulder flexion AROM to 160 deg to be able to reach into kitchen cabinets  pt will improve R shoulder flexion strength to 4/5 for improved lifting for work activities  pt will improve R shoulder ER AROM to 60 deg for washing his hair      Summary of Evaluation:  Assessment: Pt is a 44 yo male that

## 2023-08-02 ENCOUNTER — HOSPITAL ENCOUNTER (OUTPATIENT)
Dept: PHYSICAL THERAPY | Age: 38
Setting detail: THERAPIES SERIES
Discharge: HOME | End: 2023-08-02
Payer: OTHER GOVERNMENT

## 2023-08-02 PROCEDURE — 97110 THERAPEUTIC EXERCISES: CPT

## 2023-08-02 PROCEDURE — 97140 MANUAL THERAPY 1/> REGIONS: CPT

## 2023-08-02 NOTE — FLOWSHEET NOTE
Outpatient Physical Therapy  [facility]           [x] Phone: [phone]   Fax: [phone]  [facility]           [] Phone: [phone]   Fax: [phone]        Physical Therapy Daily Treatment Note  Date:  2023    Patient Name:  Vince Sheldon  \"JUAN\"   :  1985  MRN: [id number]  Restrictions/Precautions: Restrictions/Precautions: General Precautions; Surgical Protocols   [facility] rehab protocol in paper chart      Diagnosis:   Incomplete rotator cuff tear or rupture of right shoulder, not specified as traumatic [M75.111]    Date of Surgery: 23  Treatment Diagnosis:  s/p R shoulder rotator cuff and bicep repair  Insurance/Certification information: Stefani White- 75 PT/OT/ST  Referring Physician:  Belem Hicks*     PCP: Bobbye Primrose, APRN - NP  Next Doctor Visit:    Plan of care signed (Y/N):  sent   Outcome Measure: QD: 79%  Visit# / total visits:    Pain level: 0-3/10  With motion    Goals:     Patient goals: return to PLOF  Short term goals  Time Frame for Short term goals: 5 visits  pt will improve R shoulder PROM flexion to 120 deg for improved active movements  pt will improve R shoulder PROM abduction to 120 deg for improved active movements  pt will improve R shoulder AAROM flexion to 100 deg for improved active movements  Pt will report overall improvement in condition by 25% or more  pt will improve QD to 70% or less to show MDC and subjective improvement  Long Term Goals  Time Frame for Long Term Goals: 10 visits  Pt will report overall improvement in condition by 50% or more  pt will improve QD to 50% or less to show MDC and subjective improvement  pt will improve R shoulder flexion AROM to 160 deg to be able to reach into kitchen cabinets  pt will improve R shoulder flexion strength to 4/5 for improved lifting for work activities  pt will improve R shoulder ER AROM to 60 deg for washing his hair      Summary of Evaluation:  Assessment: Pt is a 46 yo male that

## 2023-08-09 ENCOUNTER — HOSPITAL ENCOUNTER (OUTPATIENT)
Dept: PHYSICAL THERAPY | Age: 38
Setting detail: THERAPIES SERIES
Discharge: HOME | End: 2023-08-09
Payer: OTHER GOVERNMENT

## 2023-08-09 PROCEDURE — 97110 THERAPEUTIC EXERCISES: CPT

## 2023-08-09 PROCEDURE — 97140 MANUAL THERAPY 1/> REGIONS: CPT

## 2023-08-09 NOTE — FLOWSHEET NOTE
patient      Communication with other providers:  POC sent       Assessment:   pt tolerated all well and is feeling better and looser post session that in the beginning today . He is making good overall progress and is tolerating more functional activity  well also. He may be pushing ahead of his protocol a little at work and at home in the yard but is tolerating that activity well overall also. End pain: 1-2/10 post session      Assessment: Pt is a 44 yo male that presents to therapy s/p R rotator cuff and biceps repair on 6/27/23. He was injured when starting a  that locked up on him. a few days before surgery he was attacked by a dog and was shaken around, worsening his current injury to R shoulder. 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 Pt demo impaired RUE ROM, strength, activity tolerance, functional mobility and increased pain. he is able to complete all adls independently and is in the process of discharging sling. He is a  and is currently on light duty restrictions, with no confirmed date of returning to full duty yet. Pt would benefit from continued skilled physical therapy to address impairments and limitations, progress toward goal completion, promote independence with ADLs, and prevent further injury.  End pain: 0/10      Plan for Next Session:   Specific Instructions for Next Treatment: RUE PROM/tasha TALAVERA, progress per protocol    Time In / Time Out:       900- 940    Timed Code/Total Treatment Minutes:       2 TE (25)   1 Man ( 15)      Next Progress Note due:   8/17/23      Plan of Care Interventions:  [x] Therapeutic Exercise  [x] Modalities:  [x] Therapeutic Activity     [] Ultrasound  [x] Estim  [] Gait Training      [] Cervical Traction [] Lumbar Traction  [x] Neuromuscular Re-education    [] Cold/hotpack [] Iontophoresis   [x] Instruction in HEP      [x] Vasopneumatic   [] Dry Needling    [x] Manual Therapy               [] Aquatic Therapy

## 2023-08-14 ENCOUNTER — HOSPITAL ENCOUNTER (OUTPATIENT)
Dept: PHYSICAL THERAPY | Age: 38
Setting detail: THERAPIES SERIES
Discharge: HOME | End: 2023-08-14
Payer: OTHER GOVERNMENT

## 2023-08-14 PROCEDURE — 97110 THERAPEUTIC EXERCISES: CPT

## 2023-08-14 PROCEDURE — 97140 MANUAL THERAPY 1/> REGIONS: CPT

## 2023-08-14 NOTE — FLOWSHEET NOTE
Supine Shoulder Flexion Extension AAROM with Dowel  - 2 x daily - 7 x weekly - 2 sets - 10 reps - 5 hold  - Seated Bicep Curls Supinated with Dumbbells  - 2 x daily - 7 x weekly - 2 sets - 10 reps  - Towel Roll Squeeze  - 2 x daily - 7 x weekly - 2 sets - 10 reps  - Circular Shoulder Pendulum with Table Support  - 2 x daily - 7 x weekly - 2 sets - 10 reps      Manual Treatments:  PROM, gentle mobes     Modalities:   deferred by patient      Communication with other providers:  POC sent       Assessment:   pt tolerated all well and is feeling better excited that restrictions are lifted and can work full duty . He is making good overall progress and is tolerating more functional activity. Shoulder is still tight, weak and fatigues quickly. He did have mild pain onset with 2nd set of active shld flexion. End pain: 1-2/10 post session      Assessment: Pt is a 46 yo male that presents to therapy s/p R rotator cuff and biceps repair on 6/27/23. He was injured when starting a  that locked up on him. a few days before surgery he was attacked by a dog and was shaken around, worsening his current injury to R shoulder. 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 Pt demo impaired RUE ROM, strength, activity tolerance, functional mobility and increased pain. he is able to complete all adls independently and is in the process of discharging sling. He is a  and is currently on light duty restrictions, with no confirmed date of returning to full duty yet. Pt would benefit from continued skilled physical therapy to address impairments and limitations, progress toward goal completion, promote independence with ADLs, and prevent further injury.  End pain: 0/10      Plan for Next Session:   Specific Instructions for Next Treatment: RUE PROM/AAROM, mobs, progress per protocol    Time In / Time Out:     1979/1593      Timed Code/Total Treatment Minutes:             Next Progress Note due:

## 2023-08-16 ENCOUNTER — HOSPITAL ENCOUNTER (OUTPATIENT)
Dept: PHYSICAL THERAPY | Age: 38
Setting detail: THERAPIES SERIES
Discharge: HOME | End: 2023-08-16
Payer: OTHER GOVERNMENT

## 2023-08-16 PROCEDURE — 97530 THERAPEUTIC ACTIVITIES: CPT

## 2023-08-16 NOTE — FLOWSHEET NOTE
his hair MET 8/16      Summary of Evaluation:  Assessment: Pt is a 46 yo male that presents to therapy s/p R rotator cuff and biceps repair on 6/27/23. He was injured when starting a  that locked up on him. a few days before surgery he was attacked by a dog and was shaken around, worsening his current injury to R shoulder. 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 Pt demo impaired RUE ROM, strength, activity tolerance, functional mobility and increased pain. he is able to complete all adls independently and is in the process of discharging sling. He is a  and is currently on light duty restrictions, with no confirmed date of returning to full duty yet. Pt would benefit from continued skilled physical therapy to address impairments and limitations, progress toward goal completion, promote independence with ADLs, and prevent further injury. Subjective: Pt reports that he is now full duty and notes that it is going fine. He notes that he is 75-80% improved overall since starting therapy. He reports that it doesn't really hurt at all but that once the swelling goes down thinks he will be better. He notes that he is ready to be done, is not having any problems. He reports that he thinks he did the same one to the LUE and is going to get that checked and possibly have surgery on that one too. QD: 29%      Any changes in Ambulatory Summary Sheet? None       Objective:    R shoulder AROM:   Flexion: 170 deg  Abd: 180 deg  ER: 78 deg    R shoulder flexion strength: 4/5    Exercises: (No more than 4 columns)  R SHOULDER  RC repair 6/27/23  7wks post 8/15/23  Exercise/Equipment 6# 8/9/23 #7 8/14/23 8/16/23 #8           WARM UP      pulley 1*10 each     UBE 4 min  2 min each fwd/retro.       TABLE      Supine cane flexion  2*10     Cane ER  2*10      squeeze      Bicep curl 4# 2*10     Supine serratus punches 2*10      Scap retractions  --     SL ER  2*10     SL punch

## 2023-08-16 NOTE — DISCHARGE SUMMARY
Outpatient Physical Therapy           [place]           [x] Phone: [phone]   Fax: [phone]  [facility]           [] Phone: [phone]   Fax: [phone]      To: Kirill Ramirez*     From: Eladia Johnson, PT, DPT     Patient: Abner Vargas                    : 1985  Diagnosis:  Incomplete rotator cuff tear or rupture of right shoulder, not specified as traumatic [M75.111]        Treatment Diagnosis:   s/p R shoulder rotator cuff and bicep repair    Date: 2023  []  Progress Note                [x]  Discharge Note    Evaluation Date:  23   Total Visits to date:   8 Cancels/No-shows to date:  0    Subjective:   Pt reports that he is now full duty and notes that it is going fine. He notes that he is 75-80% improved overall since starting therapy. He reports that it doesn't really hurt at all but that once the swelling goes down thinks he will be better. He notes that he is ready to be done, is not having any problems. He reports that he thinks he did the same one to the LUE and is going to get that checked and possibly have surgery on that one too. QD: 29%      Plan of Care/Treatment to date:  [x] Therapeutic Exercise    [x] Modalities:  [x] Therapeutic Activity     [] Ultrasound  [x] Electrical Stimulation  [] Gait Training      [] Cervical Traction   [] Lumbar Traction  [x] Neuromuscular Re-education  [] Cold/hotpack [] Iontophoresis  [x] Instruction in HEP      Other:  [x] Manual Therapy       [x]  Vasopneumatic  [] Aquatic Therapy       []   Dry Needle Therapy                      Objective/Significant Findings At Last Visit/Comments:    R shoulder AROM:   Flexion: 170 deg  Abd: 180 deg  ER: 78 deg     R shoulder flexion strength: 4/5    Assessment:  Pt has shown good progress since therapy start regarding improved RUE ROM/strength, activity tolerance, work tolerance, functional mobility and pain. Pt is now back to full duty at work. pt has improved QD from 79% to 29%.

## 2024-08-16 ENCOUNTER — OFFICE VISIT (OUTPATIENT)
Dept: CARDIOLOGY CLINIC | Age: 39
End: 2024-08-16
Payer: COMMERCIAL

## 2024-08-16 VITALS
HEIGHT: 72 IN | WEIGHT: 283.6 LBS | SYSTOLIC BLOOD PRESSURE: 164 MMHG | BODY MASS INDEX: 38.41 KG/M2 | DIASTOLIC BLOOD PRESSURE: 112 MMHG | HEART RATE: 93 BPM

## 2024-08-16 DIAGNOSIS — R06.02 SHORTNESS OF BREATH: ICD-10-CM

## 2024-08-16 DIAGNOSIS — R94.31 ABNORMAL EKG: Primary | ICD-10-CM

## 2024-08-16 DIAGNOSIS — I10 ESSENTIAL HYPERTENSION: ICD-10-CM

## 2024-08-16 DIAGNOSIS — E66.01 CLASS 2 SEVERE OBESITY DUE TO EXCESS CALORIES WITH SERIOUS COMORBIDITY AND BODY MASS INDEX (BMI) OF 38.0 TO 38.9 IN ADULT (HCC): ICD-10-CM

## 2024-08-16 PROCEDURE — 99204 OFFICE O/P NEW MOD 45 MIN: CPT | Performed by: INTERNAL MEDICINE

## 2024-08-16 PROCEDURE — 3080F DIAST BP >= 90 MM HG: CPT | Performed by: INTERNAL MEDICINE

## 2024-08-16 PROCEDURE — 3077F SYST BP >= 140 MM HG: CPT | Performed by: INTERNAL MEDICINE

## 2024-08-16 PROCEDURE — 93000 ELECTROCARDIOGRAM COMPLETE: CPT | Performed by: INTERNAL MEDICINE

## 2024-08-16 RX ORDER — MELOXICAM 15 MG/1
1 TABLET ORAL AS NEEDED
COMMUNITY

## 2024-08-16 RX ORDER — VALSARTAN AND HYDROCHLOROTHIAZIDE 160; 25 MG/1; MG/1
1 TABLET ORAL DAILY
Qty: 30 TABLET | Refills: 3 | Status: SHIPPED | OUTPATIENT
Start: 2024-08-16

## 2024-08-16 RX ORDER — AMLODIPINE BESYLATE 5 MG/1
1 TABLET ORAL DAILY
COMMUNITY

## 2024-08-16 RX ORDER — VALSARTAN 320 MG/1
320 TABLET ORAL DAILY
COMMUNITY
End: 2024-08-16 | Stop reason: ALTCHOICE

## 2024-08-16 RX ORDER — ROSUVASTATIN CALCIUM 10 MG/1
1 TABLET, COATED ORAL DAILY
COMMUNITY

## 2024-08-16 NOTE — PROGRESS NOTES
Wilbert Fairbanks MD                                  CARDIOLOGY  NOTE            Chief Complaint:    Chief Complaint   Patient presents with    New Patient     New patient follow up from [facility] for abnormal EKG  Denies any chest pain, swelling, or palpitations  Complains of SOB and dizziness        HPI:     Bull is a 39 y.o. year old male who presented to [facility] in [place] earlier this month with chief complaint of neck pain and right upper extremity numbness and tingling.  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  As per patient there was a lot of commotion in the ER, doctors and nurses rushed to the room and asked if he is having any chest pains, there was a concern for STEMI however it was ruled out when patient told them he did not have any chest pains and cardiac troponin x 2 is came back negative.  His EKG was compared to prior EKGs as well as per documentation and was noted to have subtle differences.    Patient denies any prior established history of CAD CHF arrhythmia  He has history of essential hypertension on medications.  Currently vapes, prior history of smoking.  Social drinking denies any drug    EKG in the office today shows sinus rhythm, nonspecific ST changes in inferior leads, deep S waves in anterior leads, I do not appreciate a Q wave or evidence of prior MI.                Current Outpatient Medications   Medication Sig Dispense Refill    amLODIPine (NORVASC) 5 MG tablet Take 1 tablet by mouth daily      rosuvastatin (CRESTOR) 10 MG tablet Take 1 tablet by mouth daily      meloxicam (MOBIC) 15 MG tablet Take 1 tablet by mouth as needed      valsartan-hydroCHLOROthiazide (DIOVAN HCT) 160-25 MG per tablet Take 1 tablet by mouth daily 30 tablet 3     No current facility-administered medications for this visit.       Allergies:     Patient has no known allergies.    Patient History:    History reviewed. No

## 2024-08-22 ENCOUNTER — TELEPHONE (OUTPATIENT)
Dept: CARDIOLOGY CLINIC | Age: 39
End: 2024-08-22

## 2024-08-22 NOTE — TELEPHONE ENCOUNTER
Called to patient the results of recent testing   NM -    Stress Combined Conclusion: Normal treadmill myocardial perfusion study at 94% PHR. Findings suggest a low risk of cardiac events.    Perfusion Comments: LV perfusion is normal.    Perfusion Conclusion: There is no evidence of transient ischemic dilation (TID).    Image quality is good.    Stress Test: A Pancho protocol stress test was performed. The patient reached stage 3 of the protocol and was stressed for 9 min and 0 sec. Hemodynamics are adequate for diagnosis. Blood pressure demonstrated a normal response and heart rate demonstrated a normal response to stress. The patient's heart rate recovery was normal.  Echo -    Left Ventricle: Normal left ventricular systolic function with a visually estimated EF of 55 - 60%. Left ventricle size is normal. Mild septal thickening. Normal wall motion. Normal diastolic function.    Mitral Valve: Mild regurgitation.    Tricuspid Valve: Mild regurgitation. Normal RVSP. The estimated RVSP is 32 mmHg.    Pericardium: No pericardial effusion.    Image quality is good.  Patient verbalized understanding of all information given.

## 2024-09-26 ENCOUNTER — OFFICE VISIT (OUTPATIENT)
Dept: CARDIOLOGY CLINIC | Age: 39
End: 2024-09-26
Payer: COMMERCIAL

## 2024-09-26 VITALS
DIASTOLIC BLOOD PRESSURE: 82 MMHG | HEIGHT: 72 IN | SYSTOLIC BLOOD PRESSURE: 130 MMHG | WEIGHT: 281 LBS | BODY MASS INDEX: 38.06 KG/M2 | OXYGEN SATURATION: 98 % | HEART RATE: 82 BPM

## 2024-09-26 DIAGNOSIS — E78.5 DYSLIPIDEMIA: ICD-10-CM

## 2024-09-26 DIAGNOSIS — R94.31 ABNORMAL EKG: Primary | ICD-10-CM

## 2024-09-26 DIAGNOSIS — I10 ESSENTIAL HYPERTENSION: ICD-10-CM

## 2024-09-26 DIAGNOSIS — E66.01 CLASS 2 SEVERE OBESITY DUE TO EXCESS CALORIES WITH SERIOUS COMORBIDITY AND BODY MASS INDEX (BMI) OF 38.0 TO 38.9 IN ADULT: ICD-10-CM

## 2024-09-26 PROCEDURE — 3079F DIAST BP 80-89 MM HG: CPT | Performed by: INTERNAL MEDICINE

## 2024-09-26 PROCEDURE — 99214 OFFICE O/P EST MOD 30 MIN: CPT | Performed by: INTERNAL MEDICINE

## 2024-09-26 PROCEDURE — 3075F SYST BP GE 130 - 139MM HG: CPT | Performed by: INTERNAL MEDICINE

## 2024-12-06 RX ORDER — VALSARTAN AND HYDROCHLOROTHIAZIDE 160; 25 MG/1; MG/1
1 TABLET ORAL DAILY
Qty: 90 TABLET | Refills: 3 | Status: SHIPPED | OUTPATIENT
Start: 2024-12-06

## 2025-06-26 NOTE — PROGRESS NOTES
MRN: [id number]  Name: Bull Leger  : 1985    Insurance: Payor: Cass ArtNICHOLAS /  /  /      Phone #: [phone]  Provider: Wilbert Fairbanks MD     Date of Visit: 2025    Reason for visit: 9 MO Recent Hospitalization Date:    Reason for Hospitalization:    Last EK  Type of Device:       Vitals BP HR O2% WT HT ORTHO BP LYING ORTHO BP SITTING ORTHO BP SITTING   Today's Findings           Patients work up- Check List     Testing Last Date Completed Date Expected  ([facility]) Additional Notes    MA to document For provider to complete Either MA or Provider    Carotid Duplex  STAT 1 WK 6 MTH       THIS WK 2 WK 1 YEAR     Cardiac CTA  STAT 1 WK 6 MTH       THIS WK 2 WK 1 YEAR     Cardiac CT Calcium scoring  STAT 1 WK 6 MTH       THIS WK 2 WK 1 YEAR     CTA Chest, Abdomen & Pelvis  STAT 1 WK 6 MTH       THIS WK 2 WK 1 YEAR     CT Chest IV w/ Contrast  STAT 1 WK 6 MTH       THIS WK 2 WK 1 YEAR     CT Chest w/o Contrast  STAT 1 WK 6 MTH       THIS WK 2 WK 1 YEAR     CXR  STAT 1 WK 6 MTH       THIS WK 2 WK 1 YEAR     ECHO 8/24 Stress Complete Limited     MRI- Cardiac  STAT 1 WK 6 MTH       THIS WK 2 WK 1 YEAR     MUGA Scan  STAT 1 WK 6 MTH       THIS WK 2 WK 1 YEAR     Nuclear Stress 8/ Lexiscan Cardiolite     PFT  STAT 1 WK 6 MTH       THIS WK 2 WK 1 YEAR     Treadmill Stress Test  STAT 1 WK 6 MTH       THIS WK 2 WK 1 YEAR     Vascular Duplex  Lower: Right Left Bilat       Upper: Right Left Bilat     Other Test Not Listed:    Monitors Last Date Completed Day's Request/Ordered     Holter  Short term 24 hours 48 hours      Long term 3 days 7 days 14 days   Event   (1-30 days)      Procedures Last Date Performed Procedure Details Date Expected   Additional Notes    ASD Closure        Carotid Angio        Cardioversion        Heart Cath  R L R&L      Peripheral Angio  R L      PFO Closure        PTCA/PCI        SHARON        SHARON/Cardioversion        Venogram        Tilt Table        Other Type of Procedure:

## 2025-07-09 ENCOUNTER — OFFICE VISIT (OUTPATIENT)
Dept: CARDIOLOGY CLINIC | Age: 40
End: 2025-07-09
Payer: COMMERCIAL

## 2025-07-09 VITALS
HEIGHT: 72 IN | WEIGHT: 272 LBS | BODY MASS INDEX: 36.84 KG/M2 | HEART RATE: 83 BPM | DIASTOLIC BLOOD PRESSURE: 66 MMHG | SYSTOLIC BLOOD PRESSURE: 136 MMHG

## 2025-07-09 DIAGNOSIS — E66.01 CLASS 2 SEVERE OBESITY DUE TO EXCESS CALORIES WITH SERIOUS COMORBIDITY AND BODY MASS INDEX (BMI) OF 36.0 TO 36.9 IN ADULT (HCC): ICD-10-CM

## 2025-07-09 DIAGNOSIS — E66.812 CLASS 2 SEVERE OBESITY DUE TO EXCESS CALORIES WITH SERIOUS COMORBIDITY AND BODY MASS INDEX (BMI) OF 36.0 TO 36.9 IN ADULT (HCC): ICD-10-CM

## 2025-07-09 DIAGNOSIS — I10 ESSENTIAL HYPERTENSION: Primary | ICD-10-CM

## 2025-07-09 DIAGNOSIS — E78.5 DYSLIPIDEMIA: ICD-10-CM

## 2025-07-09 PROCEDURE — 3075F SYST BP GE 130 - 139MM HG: CPT | Performed by: INTERNAL MEDICINE

## 2025-07-09 PROCEDURE — 93000 ELECTROCARDIOGRAM COMPLETE: CPT | Performed by: INTERNAL MEDICINE

## 2025-07-09 PROCEDURE — 3078F DIAST BP <80 MM HG: CPT | Performed by: INTERNAL MEDICINE

## 2025-07-09 PROCEDURE — 99214 OFFICE O/P EST MOD 30 MIN: CPT | Performed by: INTERNAL MEDICINE

## 2025-07-09 NOTE — PROGRESS NOTES
Wilbert Fairbanks MD                                  CARDIOLOGY  NOTE            Chief Complaint:    Chief Complaint   Patient presents with    Follow-up        Echocardiogram 8/21/2024      Left Ventricle: Normal left ventricular systolic function with a visually estimated EF of 55 - 60%. Left ventricle size is normal. Mild septal thickening. Normal wall motion. Normal diastolic function.    Mitral Valve: Mild regurgitation.    Tricuspid Valve: Mild regurgitation. Normal RVSP. The estimated RVSP is 32 mmHg.    Pericardium: No pericardial effusion.    Image quality is good.     Normal Exercise Stress MPI 8/20/2024   Good Functional Capacity  EKG tracings, no ischemia noted.  (Rare PVCs noted in stage II of exercise)    Normal Hemodynamic response.  No Arrhythmias noted  DUKE Score  9  Low cardiovascular risk   LVEF ~ 53  %      Recommendations:     Cont with medical therapy   Risk Factor Modification  Outpt follow up as routine             HPI:     Bull is a 40 y.o. year old male who presented to [facility] in [place] earlier this month with chief complaint of neck pain and right upper extremity numbness and tingling.  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  As per patient there was a lot of commotion in the ER, doctors and nurses rushed to the room and asked if he is having any chest pains, there was a concern for STEMI however it was ruled out when patient told them he did not have any chest pains and cardiac troponin x 2 is came back negative.  His EKG was compared to prior EKGs as well as per documentation and was noted to have subtle differences.    Patient denies any prior established history of CAD CHF arrhythmia  He has history of essential hypertension on medications.  Currently vapes, prior history of smoking.  Social drinking denies any drug    EKG in the office today shows sinus rhythm, nonspecific ST changes in inferior leads, deep

## 2025-07-09 NOTE — PROGRESS NOTES
CLINICAL STAFF DOCUMENTATION    Dr. Wilbert Leger  1985  [id number]    Have you had any Chest Pain recently? - No    Have you had any Shortness of Breath - No  Have you had any dizziness - Yes  If Yes DO ORTHOSTATIC BP including pulse  When do you feel dizzy? with position change  Does the room spin? No  How long does it last .3  seconds     Have patient lie down for 5 minutes then measure blood pressure and pulse rate.   Have the patient stand.   Repeat blood pressure and pulse rate after patient has been standing for 1 minute   Repeat blood pressure and pulse rate after patient has been standing for 3 minutes.   Be sure to ask what symptoms they are having if they get dizzy while completing ortho stats such as: room spinning, nausea, etc.    Have you had any palpitations recently? - No  Any thyroid issues? - No    Do you have any edema - swelling in no    Is the patient on any of the following medications -   If Yes DO EKG - Needs done every 3 months    When did you have your last labs drawn nothing recent  What doctor ordered   Do we have the labs in their chart no  If we do not have the labs, ask where they were drawn     If we do not have these labs, you are retrieve these labs for the provider!    Do you need any prescriptions refilled? - No    Do you have a surgery or procedure scheduled in the near future - No  Do use tobacco products? - Yes  Do you drink alcohol? - No  Do you use any illicit drugs? - No  Caffeine? - Yes  How much caffeine? .2  cups
